# Patient Record
Sex: FEMALE | Race: BLACK OR AFRICAN AMERICAN | NOT HISPANIC OR LATINO | Employment: FULL TIME | RURAL
[De-identification: names, ages, dates, MRNs, and addresses within clinical notes are randomized per-mention and may not be internally consistent; named-entity substitution may affect disease eponyms.]

---

## 2018-05-29 ENCOUNTER — HISTORICAL (OUTPATIENT)
Dept: ADMINISTRATIVE | Facility: HOSPITAL | Age: 28
End: 2018-05-29

## 2018-05-31 LAB
LAB AP CLINICAL INFORMATION: NORMAL
LAB AP COMMENTS: NORMAL
LAB AP DIAGNOSIS - HISTORICAL: NORMAL
LAB AP GROSS PATHOLOGY - HISTORICAL: NORMAL
LAB AP SPECIMEN SUBMITTED - HISTORICAL: NORMAL

## 2018-07-03 ENCOUNTER — HISTORICAL (OUTPATIENT)
Dept: ADMINISTRATIVE | Facility: HOSPITAL | Age: 28
End: 2018-07-03

## 2018-07-06 LAB
LAB AP GENERAL CAT - HISTORICAL: NORMAL
LAB AP INTERPRETATION/RESULT - HISTORICAL: NEGATIVE
LAB AP SPECIMEN ADEQUACY - HISTORICAL: NORMAL
LAB AP SPECIMEN SUBMITTED - HISTORICAL: NORMAL

## 2018-07-16 ENCOUNTER — HISTORICAL (OUTPATIENT)
Dept: ADMINISTRATIVE | Facility: HOSPITAL | Age: 28
End: 2018-07-16

## 2018-07-17 LAB
LAB AP CLINICAL INFORMATION: NORMAL
LAB AP DIAGNOSIS - HISTORICAL: NORMAL
LAB AP GROSS PATHOLOGY - HISTORICAL: NORMAL
LAB AP SPECIMEN SUBMITTED - HISTORICAL: NORMAL

## 2020-04-02 ENCOUNTER — HISTORICAL (OUTPATIENT)
Dept: ADMINISTRATIVE | Facility: HOSPITAL | Age: 30
End: 2020-04-02

## 2021-06-17 ENCOUNTER — OFFICE VISIT (OUTPATIENT)
Dept: OTOLARYNGOLOGY | Facility: CLINIC | Age: 31
End: 2021-06-17
Payer: MEDICAID

## 2021-06-17 VITALS — WEIGHT: 288 LBS | BODY MASS INDEX: 47.98 KG/M2 | HEIGHT: 65 IN

## 2021-06-17 DIAGNOSIS — J02.9 SORE THROAT: Primary | ICD-10-CM

## 2021-06-17 DIAGNOSIS — R13.10 DYSPHAGIA, UNSPECIFIED TYPE: ICD-10-CM

## 2021-06-17 DIAGNOSIS — K21.9 GASTROESOPHAGEAL REFLUX DISEASE, UNSPECIFIED WHETHER ESOPHAGITIS PRESENT: ICD-10-CM

## 2021-06-17 PROCEDURE — 99204 PR OFFICE/OUTPT VISIT, NEW, LEVL IV, 45-59 MIN: ICD-10-PCS | Mod: S$PBB,,, | Performed by: OTOLARYNGOLOGY

## 2021-06-17 PROCEDURE — 99213 OFFICE O/P EST LOW 20 MIN: CPT | Mod: PBBFAC | Performed by: OTOLARYNGOLOGY

## 2021-06-17 PROCEDURE — 99204 OFFICE O/P NEW MOD 45 MIN: CPT | Mod: S$PBB,,, | Performed by: OTOLARYNGOLOGY

## 2021-08-20 ENCOUNTER — OFFICE VISIT (OUTPATIENT)
Dept: FAMILY MEDICINE | Facility: CLINIC | Age: 31
End: 2021-08-20
Payer: MEDICAID

## 2021-08-20 VITALS
HEART RATE: 100 BPM | RESPIRATION RATE: 20 BRPM | OXYGEN SATURATION: 95 % | TEMPERATURE: 98 F | SYSTOLIC BLOOD PRESSURE: 100 MMHG | BODY MASS INDEX: 46.09 KG/M2 | DIASTOLIC BLOOD PRESSURE: 70 MMHG | WEIGHT: 276.63 LBS | HEIGHT: 65 IN

## 2021-08-20 DIAGNOSIS — Z11.52 ENCOUNTER FOR SCREENING FOR COVID-19: ICD-10-CM

## 2021-08-20 DIAGNOSIS — D64.9 ANEMIA, UNSPECIFIED TYPE: Primary | ICD-10-CM

## 2021-08-20 LAB
ANISOCYTOSIS BLD QL SMEAR: NORMAL
BASOPHILS # BLD AUTO: 0.02 K/UL (ref 0–0.2)
BASOPHILS NFR BLD AUTO: 0.5 % (ref 0–1)
CTP QC/QA: YES
DIFFERENTIAL METHOD BLD: ABNORMAL
EOSINOPHIL # BLD AUTO: 0 K/UL (ref 0–0.5)
EOSINOPHIL NFR BLD AUTO: 0 % (ref 1–4)
ERYTHROCYTE [DISTWIDTH] IN BLOOD BY AUTOMATED COUNT: 16.8 % (ref 11.5–14.5)
FLUAV AG NPH QL: NEGATIVE
FLUBV AG NPH QL: NEGATIVE
HCT VFR BLD AUTO: 27 % (ref 38–47)
HGB BLD-MCNC: 7.5 G/DL (ref 12–16)
HYPOCHROMIA BLD QL SMEAR: NORMAL
IMM GRANULOCYTES # BLD AUTO: 0.01 K/UL (ref 0–0.04)
IMM GRANULOCYTES NFR BLD: 0.2 % (ref 0–0.4)
LYMPHOCYTES # BLD AUTO: 1.17 K/UL (ref 1–4.8)
LYMPHOCYTES NFR BLD AUTO: 28.7 % (ref 27–41)
MCH RBC QN AUTO: 20.2 PG (ref 27–31)
MCHC RBC AUTO-ENTMCNC: 27.8 G/DL (ref 32–36)
MCV RBC AUTO: 72.6 FL (ref 80–96)
MICROCYTES BLD QL SMEAR: NORMAL
MONOCYTES # BLD AUTO: 0.67 K/UL (ref 0–0.8)
MONOCYTES NFR BLD AUTO: 16.5 % (ref 2–6)
MPC BLD CALC-MCNC: 10.9 FL (ref 9.4–12.4)
NEUTROPHILS # BLD AUTO: 2.2 K/UL (ref 1.8–7.7)
NEUTROPHILS NFR BLD AUTO: 54.1 % (ref 53–65)
NRBC # BLD AUTO: 0 X10E3/UL
NRBC, AUTO (.00): 0 %
OVALOCYTES BLD QL SMEAR: NORMAL
PLATELET # BLD AUTO: 390 K/UL (ref 150–400)
PLATELET MORPHOLOGY: NORMAL
POLYCHROMASIA BLD QL SMEAR: NORMAL
RBC # BLD AUTO: 3.72 M/UL (ref 4.2–5.4)
ROULEAUX BLD QL SMEAR: NORMAL
SARS-COV-2 AG RESP QL IA.RAPID: NEGATIVE
WBC # BLD AUTO: 4.07 K/UL (ref 4.5–11)

## 2021-08-20 PROCEDURE — 87428 SARSCOV & INF VIR A&B AG IA: CPT | Mod: RHCUB | Performed by: NURSE PRACTITIONER

## 2021-08-20 PROCEDURE — 85025 CBC WITH DIFFERENTIAL: ICD-10-PCS | Mod: ,,, | Performed by: CLINICAL MEDICAL LABORATORY

## 2021-08-20 PROCEDURE — 99213 OFFICE O/P EST LOW 20 MIN: CPT | Mod: ,,, | Performed by: NURSE PRACTITIONER

## 2021-08-20 PROCEDURE — 99213 PR OFFICE/OUTPT VISIT, EST, LEVL III, 20-29 MIN: ICD-10-PCS | Mod: ,,, | Performed by: NURSE PRACTITIONER

## 2021-08-20 PROCEDURE — 85025 COMPLETE CBC W/AUTO DIFF WBC: CPT | Mod: ,,, | Performed by: CLINICAL MEDICAL LABORATORY

## 2022-01-07 ENCOUNTER — CLINICAL SUPPORT (OUTPATIENT)
Dept: PRIMARY CARE CLINIC | Facility: CLINIC | Age: 32
End: 2022-01-07

## 2022-01-07 DIAGNOSIS — Z02.89 ENCOUNTER FOR PHYSICAL EXAMINATION RELATED TO EMPLOYMENT: ICD-10-CM

## 2022-01-07 DIAGNOSIS — Z11.1 SCREENING-PULMONARY TB: Primary | ICD-10-CM

## 2022-01-07 PROCEDURE — 86580 PR  TB INTRADERMAL TEST: ICD-10-PCS | Mod: ,,, | Performed by: NURSE PRACTITIONER

## 2022-01-07 PROCEDURE — 99499 UNLISTED E&M SERVICE: CPT | Mod: ,,, | Performed by: NURSE PRACTITIONER

## 2022-01-07 PROCEDURE — 86580 TB INTRADERMAL TEST: CPT | Mod: ,,, | Performed by: NURSE PRACTITIONER

## 2022-01-07 PROCEDURE — 99499 PR PHYSICAL - BASIC NON DOT/CDL: ICD-10-PCS | Mod: ,,, | Performed by: NURSE PRACTITIONER

## 2022-01-10 NOTE — PROGRESS NOTES
Subjective:       Patient ID: Coby Driver is a 31 y.o. female.    Chief Complaint: No chief complaint on file.    HPI  Review of Systems      Objective:      Physical Exam    Assessment:       Problem List Items Addressed This Visit    None     Visit Diagnoses     Screening-pulmonary TB    -  Primary    Relevant Orders    POCT TB Skin Test (Completed)    Encounter for physical examination related to employment              Plan:       See scanned documents in .

## 2022-01-27 ENCOUNTER — APPOINTMENT (OUTPATIENT)
Dept: LAB | Facility: CLINIC | Age: 32
End: 2022-01-27
Payer: MEDICAID

## 2022-01-27 DIAGNOSIS — Z20.822 EXPOSURE TO COVID-19 VIRUS: Primary | ICD-10-CM

## 2022-01-27 LAB
CTP QC/QA: YES
SARS-COV-2 AG RESP QL IA.RAPID: NEGATIVE

## 2022-01-27 PROCEDURE — 87426 SARSCOV CORONAVIRUS AG IA: CPT | Mod: RHCUB | Performed by: NURSE PRACTITIONER

## 2023-02-23 ENCOUNTER — OFFICE VISIT (OUTPATIENT)
Dept: DERMATOLOGY | Facility: CLINIC | Age: 33
End: 2023-02-23
Payer: COMMERCIAL

## 2023-02-23 DIAGNOSIS — L70.0 ACNE VULGARIS: ICD-10-CM

## 2023-02-23 DIAGNOSIS — L21.9 SEBORRHEIC DERMATITIS: Primary | ICD-10-CM

## 2023-02-23 DIAGNOSIS — L65.9 ALOPECIA: ICD-10-CM

## 2023-02-23 PROCEDURE — 1160F PR REVIEW ALL MEDS BY PRESCRIBER/CLIN PHARMACIST DOCUMENTED: ICD-10-PCS | Mod: CPTII,,, | Performed by: DERMATOLOGY

## 2023-02-23 PROCEDURE — 1159F PR MEDICATION LIST DOCUMENTED IN MEDICAL RECORD: ICD-10-PCS | Mod: CPTII,,, | Performed by: DERMATOLOGY

## 2023-02-23 PROCEDURE — 1160F RVW MEDS BY RX/DR IN RCRD: CPT | Mod: CPTII,,, | Performed by: DERMATOLOGY

## 2023-02-23 PROCEDURE — 99204 OFFICE O/P NEW MOD 45 MIN: CPT | Mod: ,,, | Performed by: DERMATOLOGY

## 2023-02-23 PROCEDURE — 3044F PR MOST RECENT HEMOGLOBIN A1C LEVEL <7.0%: ICD-10-PCS | Mod: CPTII,,, | Performed by: DERMATOLOGY

## 2023-02-23 PROCEDURE — 3044F HG A1C LEVEL LT 7.0%: CPT | Mod: CPTII,,, | Performed by: DERMATOLOGY

## 2023-02-23 PROCEDURE — 1159F MED LIST DOCD IN RCRD: CPT | Mod: CPTII,,, | Performed by: DERMATOLOGY

## 2023-02-23 PROCEDURE — 99204 PR OFFICE/OUTPT VISIT, NEW, LEVL IV, 45-59 MIN: ICD-10-PCS | Mod: ,,, | Performed by: DERMATOLOGY

## 2023-02-23 RX ORDER — TRETINOIN 0.25 MG/G
CREAM TOPICAL
Qty: 20 G | Refills: 5 | OUTPATIENT
Start: 2023-02-23 | End: 2023-05-27

## 2023-02-23 RX ORDER — KETOCONAZOLE 20 MG/ML
SHAMPOO, SUSPENSION TOPICAL
Qty: 120 ML | Refills: 11 | OUTPATIENT
Start: 2023-02-23 | End: 2023-05-27

## 2023-02-23 RX ORDER — DESONIDE 0.5 MG/G
CREAM TOPICAL
Qty: 60 G | Refills: 3 | OUTPATIENT
Start: 2023-02-23 | End: 2023-05-27

## 2023-02-23 RX ORDER — ASPIRIN 325 MG
50000 TABLET, DELAYED RELEASE (ENTERIC COATED) ORAL
COMMUNITY
Start: 2023-01-26 | End: 2023-05-27

## 2023-02-23 RX ORDER — CICLOPIROX 80 MG/ML
SOLUTION TOPICAL
Qty: 6.6 ML | Refills: 5 | OUTPATIENT
Start: 2023-02-23 | End: 2023-05-27

## 2023-02-23 RX ORDER — KETOCONAZOLE 20 MG/G
CREAM TOPICAL
Qty: 30 G | Refills: 6 | Status: CANCELLED | OUTPATIENT
Start: 2023-02-23

## 2023-02-23 RX ORDER — CLOBETASOL PROPIONATE 0.46 MG/ML
SOLUTION TOPICAL
Qty: 50 ML | Refills: 3 | OUTPATIENT
Start: 2023-02-23 | End: 2023-05-27

## 2023-05-19 ENCOUNTER — OFFICE VISIT (OUTPATIENT)
Dept: OBSTETRICS AND GYNECOLOGY | Facility: CLINIC | Age: 33
End: 2023-05-19
Payer: COMMERCIAL

## 2023-05-19 VITALS
RESPIRATION RATE: 17 BRPM | OXYGEN SATURATION: 99 % | WEIGHT: 292.19 LBS | SYSTOLIC BLOOD PRESSURE: 138 MMHG | BODY MASS INDEX: 48.68 KG/M2 | TEMPERATURE: 99 F | HEART RATE: 89 BPM | DIASTOLIC BLOOD PRESSURE: 87 MMHG | HEIGHT: 65 IN

## 2023-05-19 DIAGNOSIS — N92.1 BREAKTHROUGH BLEEDING ON NEXPLANON: Primary | ICD-10-CM

## 2023-05-19 DIAGNOSIS — Z97.5 BREAKTHROUGH BLEEDING ON NEXPLANON: Primary | ICD-10-CM

## 2023-05-19 DIAGNOSIS — N92.6 IRREGULAR MENSTRUAL CYCLE: ICD-10-CM

## 2023-05-19 DIAGNOSIS — Z32.02 URINE PREGNANCY TEST NEGATIVE: ICD-10-CM

## 2023-05-19 LAB
B-HCG UR QL: NEGATIVE
CTP QC/QA: YES

## 2023-05-19 PROCEDURE — 99203 OFFICE O/P NEW LOW 30 MIN: CPT | Mod: ,,, | Performed by: ADVANCED PRACTICE MIDWIFE

## 2023-05-19 PROCEDURE — 3075F SYST BP GE 130 - 139MM HG: CPT | Mod: CPTII,,, | Performed by: ADVANCED PRACTICE MIDWIFE

## 2023-05-19 PROCEDURE — 3044F HG A1C LEVEL LT 7.0%: CPT | Mod: CPTII,,, | Performed by: ADVANCED PRACTICE MIDWIFE

## 2023-05-19 PROCEDURE — 99203 PR OFFICE/OUTPT VISIT, NEW, LEVL III, 30-44 MIN: ICD-10-PCS | Mod: ,,, | Performed by: ADVANCED PRACTICE MIDWIFE

## 2023-05-19 PROCEDURE — 3008F BODY MASS INDEX DOCD: CPT | Mod: CPTII,,, | Performed by: ADVANCED PRACTICE MIDWIFE

## 2023-05-19 PROCEDURE — 1159F MED LIST DOCD IN RCRD: CPT | Mod: CPTII,,, | Performed by: ADVANCED PRACTICE MIDWIFE

## 2023-05-19 PROCEDURE — 3075F PR MOST RECENT SYSTOLIC BLOOD PRESS GE 130-139MM HG: ICD-10-PCS | Mod: CPTII,,, | Performed by: ADVANCED PRACTICE MIDWIFE

## 2023-05-19 PROCEDURE — 3079F DIAST BP 80-89 MM HG: CPT | Mod: CPTII,,, | Performed by: ADVANCED PRACTICE MIDWIFE

## 2023-05-19 PROCEDURE — 3044F PR MOST RECENT HEMOGLOBIN A1C LEVEL <7.0%: ICD-10-PCS | Mod: CPTII,,, | Performed by: ADVANCED PRACTICE MIDWIFE

## 2023-05-19 PROCEDURE — 3079F PR MOST RECENT DIASTOLIC BLOOD PRESSURE 80-89 MM HG: ICD-10-PCS | Mod: CPTII,,, | Performed by: ADVANCED PRACTICE MIDWIFE

## 2023-05-19 PROCEDURE — 1159F PR MEDICATION LIST DOCUMENTED IN MEDICAL RECORD: ICD-10-PCS | Mod: CPTII,,, | Performed by: ADVANCED PRACTICE MIDWIFE

## 2023-05-19 PROCEDURE — 81025 URINE PREGNANCY TEST: CPT | Mod: QW,,, | Performed by: ADVANCED PRACTICE MIDWIFE

## 2023-05-19 PROCEDURE — 3008F PR BODY MASS INDEX (BMI) DOCUMENTED: ICD-10-PCS | Mod: CPTII,,, | Performed by: ADVANCED PRACTICE MIDWIFE

## 2023-05-19 PROCEDURE — 81025 POCT URINE PREGNANCY: ICD-10-PCS | Mod: QW,,, | Performed by: ADVANCED PRACTICE MIDWIFE

## 2023-05-19 RX ORDER — MEDROXYPROGESTERONE ACETATE 10 MG/1
10 TABLET ORAL DAILY
Qty: 10 TABLET | Refills: 0 | Status: SHIPPED | OUTPATIENT
Start: 2023-05-19 | End: 2023-06-01

## 2023-05-19 RX ORDER — FERROUS SULFATE 325(65) MG
325 TABLET, DELAYED RELEASE (ENTERIC COATED) ORAL 2 TIMES DAILY
Qty: 60 TABLET | Refills: 2 | Status: SHIPPED | OUTPATIENT
Start: 2023-05-19 | End: 2023-08-17

## 2023-05-19 NOTE — PROGRESS NOTES
"Patient ID:  Coby Driver is a 32 y.o. female      Chief Complaint:   Chief Complaint   Patient presents with    Irregular cycles     Bleeding x 3 weeks        HPI:  Coby is in for annual exam with Pap but has started cycle bleeding. Reports having Nexplanon placed 3 years past after delivery of her last child. Has had recent onset of irregular vag bleeding and weight gain. Desires management of bleeding. Discussed replacement of nexplanon or removal with no replacement as weight gain is a possible s/e. Has had weight loss surgery in past. Reports issues with low iron reviewed labs ordered by PCP. Has had Covid infection in past.  Unsure of last Pap date but reports results normal.   LMP: No LMP recorded. (Menstrual status: Birth Control).   Sexually active:  yes, , declines STD testing  Contraceptive: Nexplanon      Past Medical History:   Diagnosis Date    Acne vulgaris     Alopecia     Anemia      Past Surgical History:   Procedure Laterality Date    GASTRIC RESTRICTION SURGERY      REDUCTION OF BOTH BREASTS Bilateral     TONSILLECTOMY Bilateral        OB History          3    Para   3    Term                AB        Living             SAB        IAB        Ectopic        Multiple        Live Births   3                 /87 (BP Location: Right arm)   Pulse 89   Temp 99 °F (37.2 °C)   Resp 17   Ht 5' 5" (1.651 m)   Wt 132.5 kg (292 lb 3.2 oz)   SpO2 99%   BMI 48.62 kg/m²   Wt Readings from Last 3 Encounters:   23 132.5 kg (292 lb 3.2 oz)   21 125.5 kg (276 lb 9.6 oz)   21 130.6 kg (288 lb)          ROS:  Review of Systems   Constitutional:  Positive for unexpected weight change.   Eyes: Negative.    Respiratory: Negative.     Cardiovascular: Negative.    Gastrointestinal: Negative.    Genitourinary: Negative.    Musculoskeletal: Negative.    Neurological: Negative.    Psychiatric/Behavioral: Negative.          PHYSICAL EXAM:  Physical Exam  Constitutional:  "      Appearance: Normal appearance. She is obese.   Eyes:      Extraocular Movements: Extraocular movements intact.   Cardiovascular:      Rate and Rhythm: Normal rate.      Pulses: Normal pulses.   Pulmonary:      Effort: Pulmonary effort is normal.   Musculoskeletal:         General: Normal range of motion.      Cervical back: Normal range of motion.   Skin:     General: Skin is warm and dry.   Neurological:      Mental Status: She is alert and oriented to person, place, and time.   Psychiatric:         Mood and Affect: Mood normal.         Behavior: Behavior normal.         Thought Content: Thought content normal.         Judgment: Judgment normal.        Assessment:  Coby was seen today for irregular cycles.    Diagnoses and all orders for this visit:    Breakthrough bleeding on Nexplanon  -     medroxyPROGESTERone (PROVERA) 10 MG tablet; Take 1 tablet (10 mg total) by mouth once daily. for 10 days    Irregular menstrual cycle  -     POCT urine pregnancy  -     medroxyPROGESTERone (PROVERA) 10 MG tablet; Take 1 tablet (10 mg total) by mouth once daily. for 10 days    Urine pregnancy test negative    BMI 45.0-49.9, adult    Other orders  -     ferrous sulfate 325 (65 FE) MG EC tablet; Take 1 tablet (325 mg total) by mouth 2 (two) times daily.          ICD-10-CM ICD-9-CM    1. Breakthrough bleeding on Nexplanon  N92.1 626.6 medroxyPROGESTERone (PROVERA) 10 MG tablet    Z97.5 V45.59       2. Irregular menstrual cycle  N92.6 626.4 POCT urine pregnancy      medroxyPROGESTERone (PROVERA) 10 MG tablet      3. Urine pregnancy test negative  Z32.02 V72.41       4. BMI 45.0-49.9, adult  Z68.42 V85.42           Plan:  UPT negative  Discussed management of BTB with provera and OCP pack  Rx sent for Provera dosing, instructed on use  Sample pack of OCP given, instructed to start after provera completed if bleeding continues  Discussed nexplanon replacement or removal with no replacement  Desires OCP if decides to have  nexplanon removed w/o replacement  Rx sent for iron supplement, instructed on use    Follow up for care as needed, annual exam with Pap when bleeding stops, schedule Nexplanon procedure when ready.

## 2023-05-21 PROBLEM — N92.6 IRREGULAR MENSTRUAL CYCLE: Status: ACTIVE | Noted: 2023-05-21

## 2023-05-21 PROBLEM — Z97.5 BREAKTHROUGH BLEEDING ON NEXPLANON: Status: ACTIVE | Noted: 2023-05-21

## 2023-05-21 PROBLEM — N92.1 BREAKTHROUGH BLEEDING ON NEXPLANON: Status: ACTIVE | Noted: 2023-05-21

## 2023-05-27 ENCOUNTER — HOSPITAL ENCOUNTER (EMERGENCY)
Facility: HOSPITAL | Age: 33
Discharge: HOME | End: 2023-05-27
Payer: COMMERCIAL

## 2023-05-27 VITALS
TEMPERATURE: 98 F | RESPIRATION RATE: 19 BRPM | OXYGEN SATURATION: 97 % | HEART RATE: 98 BPM | BODY MASS INDEX: 46.65 KG/M2 | SYSTOLIC BLOOD PRESSURE: 140 MMHG | WEIGHT: 280 LBS | DIASTOLIC BLOOD PRESSURE: 84 MMHG | HEIGHT: 65 IN

## 2023-05-27 DIAGNOSIS — S90.31XA CONTUSION OF RIGHT FOOT, INITIAL ENCOUNTER: Primary | ICD-10-CM

## 2023-05-27 DIAGNOSIS — T14.90XA INJURY: ICD-10-CM

## 2023-05-27 PROCEDURE — 63600175 PHARM REV CODE 636 W HCPCS: Performed by: NURSE PRACTITIONER

## 2023-05-27 PROCEDURE — 96372 THER/PROPH/DIAG INJ SC/IM: CPT | Performed by: NURSE PRACTITIONER

## 2023-05-27 PROCEDURE — 99284 EMERGENCY DEPT VISIT MOD MDM: CPT

## 2023-05-27 PROCEDURE — 99284 PR EMERGENCY DEPT VISIT,LEVEL IV: ICD-10-PCS | Mod: ,,, | Performed by: NURSE PRACTITIONER

## 2023-05-27 PROCEDURE — 99284 EMERGENCY DEPT VISIT MOD MDM: CPT | Mod: ,,, | Performed by: NURSE PRACTITIONER

## 2023-05-27 RX ORDER — ONDANSETRON 2 MG/ML
4 INJECTION INTRAMUSCULAR; INTRAVENOUS
Status: COMPLETED | OUTPATIENT
Start: 2023-05-27 | End: 2023-05-27

## 2023-05-27 RX ORDER — MORPHINE SULFATE 4 MG/ML
4 INJECTION, SOLUTION INTRAMUSCULAR; INTRAVENOUS
Status: COMPLETED | OUTPATIENT
Start: 2023-05-27 | End: 2023-05-27

## 2023-05-27 RX ADMIN — ONDANSETRON 4 MG: 2 INJECTION INTRAMUSCULAR; INTRAVENOUS at 05:05

## 2023-05-27 RX ADMIN — MORPHINE SULFATE 4 MG: 4 INJECTION INTRAVENOUS at 05:05

## 2023-05-27 NOTE — Clinical Note
"Coby Holley" Villa was seen and treated in our emergency department on 5/27/2023.  She may return to work on 05/31/2023.       If you have any questions or concerns, please don't hesitate to call.      BLAIRE Robertson"

## 2023-05-27 NOTE — DISCHARGE INSTRUCTIONS
Follow up with primary care provider if no improvement over then next 7 days or sooner if symptoms change or worsen.  Ice to area 20 minutes every 2 hours when possible.  Keep elevated when possible.  Use crutches when ambulating if necessary.  Motrin 600mg every 8 hours as needed for pain.  Tylenol 650 mg every 4 hours as needed for pain.  Return to emergency department for any new or worsening symtpoms.

## 2023-06-01 ENCOUNTER — OFFICE VISIT (OUTPATIENT)
Dept: OBSTETRICS AND GYNECOLOGY | Facility: CLINIC | Age: 33
End: 2023-06-01
Payer: COMMERCIAL

## 2023-06-01 VITALS
OXYGEN SATURATION: 100 % | TEMPERATURE: 98 F | DIASTOLIC BLOOD PRESSURE: 79 MMHG | WEIGHT: 286.63 LBS | SYSTOLIC BLOOD PRESSURE: 132 MMHG | HEART RATE: 72 BPM | HEIGHT: 65 IN | BODY MASS INDEX: 47.75 KG/M2

## 2023-06-01 DIAGNOSIS — Z30.011 ENCOUNTER FOR INITIAL PRESCRIPTION OF CONTRACEPTIVE PILLS: ICD-10-CM

## 2023-06-01 DIAGNOSIS — Z30.46 ENCOUNTER FOR NEXPLANON REMOVAL: Primary | ICD-10-CM

## 2023-06-01 PROCEDURE — 99499 NO LOS: ICD-10-PCS | Mod: ,,, | Performed by: ADVANCED PRACTICE MIDWIFE

## 2023-06-01 PROCEDURE — 11982 REMOVE DRUG IMPLANT DEVICE: CPT | Mod: ,,, | Performed by: ADVANCED PRACTICE MIDWIFE

## 2023-06-01 PROCEDURE — 99499 UNLISTED E&M SERVICE: CPT | Mod: ,,, | Performed by: ADVANCED PRACTICE MIDWIFE

## 2023-06-01 PROCEDURE — 11982 REMOVAL OF NEXPLANON DEVICE: ICD-10-PCS | Mod: ,,, | Performed by: ADVANCED PRACTICE MIDWIFE

## 2023-06-01 RX ORDER — LEVONORGESTREL AND ETHINYL ESTRADIOL 0.1-0.02MG
1 KIT ORAL DAILY
Qty: 30 TABLET | Refills: 11 | Status: SHIPPED | OUTPATIENT
Start: 2023-06-01 | End: 2023-06-13

## 2023-06-02 NOTE — PROCEDURES
Removal of Nexplanon Device    Date/Time: 6/1/2023 10:00 AM  Performed by: Stephanie Javier CNM  Authorized by: Stephanie Javier CNM     Consent obtained:  Verbal and written  Consent given by:  Patient  Procedure risks and benefits discussed: yes    Patient questions answered: yes    Patient agrees, verbalizes understanding, and wants to proceed: yes    Educational handouts given: yes    Instructions and paperwork completed: yes    Implant grasped by: hemostat  Removal due to infection and inflammatory reaction: no    Other reason for removal:  Weight gain  Removal due to mechanical complications of IUD/Nexplanon: no    Removed with no complications: yes     Post procedure care instructions given. Rx sent for contraceptive pills as requested. Instructed on use.   Removal due to expiration: yes    Arm: left  Palpation confirms location: yes  Small stab incision was made in arm: yes  Upon removal device was intact: yes  Site was close with steri-strips and pressure bandage applied: yes  Pre-procedure timeout performed: yes  Prepped with:  povidone-iodine 7.5% surgical scrub and alcohol 70%  Local anesthetic:  Lidocaine with epinephrine   The site was cleaned  and prepped in a sterile fashion: yes

## 2023-06-05 ENCOUNTER — OFFICE VISIT (OUTPATIENT)
Dept: OBSTETRICS AND GYNECOLOGY | Facility: CLINIC | Age: 33
End: 2023-06-05
Payer: COMMERCIAL

## 2023-06-05 VITALS
OXYGEN SATURATION: 100 % | DIASTOLIC BLOOD PRESSURE: 83 MMHG | BODY MASS INDEX: 48.15 KG/M2 | WEIGHT: 289 LBS | HEIGHT: 65 IN | HEART RATE: 98 BPM | SYSTOLIC BLOOD PRESSURE: 127 MMHG

## 2023-06-05 DIAGNOSIS — Z01.419 WOMEN'S ANNUAL ROUTINE GYNECOLOGICAL EXAMINATION: Primary | ICD-10-CM

## 2023-06-05 DIAGNOSIS — Z12.4 ENCOUNTER FOR PAPANICOLAOU SMEAR FOR CERVICAL CANCER SCREENING: ICD-10-CM

## 2023-06-05 DIAGNOSIS — Z11.51 SCREENING FOR HPV (HUMAN PAPILLOMAVIRUS): ICD-10-CM

## 2023-06-05 DIAGNOSIS — Z20.2 ENCOUNTER FOR ASSESSMENT OF STD EXPOSURE: ICD-10-CM

## 2023-06-05 LAB
CANDIDA SPECIES: NEGATIVE
GARDNERELLA: POSITIVE
HIV 1+O+2 AB SERPL QL: NORMAL
SYPHILIS AB INTERPRETATION: NORMAL
TRICHOMONAS: NEGATIVE

## 2023-06-05 PROCEDURE — 3044F PR MOST RECENT HEMOGLOBIN A1C LEVEL <7.0%: ICD-10-PCS | Mod: CPTII,,, | Performed by: ADVANCED PRACTICE MIDWIFE

## 2023-06-05 PROCEDURE — 87389 HIV 1 / 2 ANTIBODY: ICD-10-PCS | Mod: ,,, | Performed by: CLINICAL MEDICAL LABORATORY

## 2023-06-05 PROCEDURE — 86780 TREPONEMA PALLIDUM (SYPHILIS) ANTIBODY: ICD-10-PCS | Mod: ,,, | Performed by: CLINICAL MEDICAL LABORATORY

## 2023-06-05 PROCEDURE — 87591 CHLAMYDIA/GONORRHOEAE(GC), PCR: ICD-10-PCS | Mod: ,,, | Performed by: CLINICAL MEDICAL LABORATORY

## 2023-06-05 PROCEDURE — 87491 CHLAMYDIA/GONORRHOEAE(GC), PCR: ICD-10-PCS | Mod: ,,, | Performed by: CLINICAL MEDICAL LABORATORY

## 2023-06-05 PROCEDURE — 87510 BACTERIAL VAGINOSIS: ICD-10-PCS | Mod: ,,, | Performed by: CLINICAL MEDICAL LABORATORY

## 2023-06-05 PROCEDURE — 3079F DIAST BP 80-89 MM HG: CPT | Mod: CPTII,,, | Performed by: ADVANCED PRACTICE MIDWIFE

## 2023-06-05 PROCEDURE — 99395 PR PREVENTIVE VISIT,EST,18-39: ICD-10-PCS | Mod: ,,, | Performed by: ADVANCED PRACTICE MIDWIFE

## 2023-06-05 PROCEDURE — 87480 BACTERIAL VAGINOSIS: ICD-10-PCS | Mod: ,,, | Performed by: CLINICAL MEDICAL LABORATORY

## 2023-06-05 PROCEDURE — 3008F PR BODY MASS INDEX (BMI) DOCUMENTED: ICD-10-PCS | Mod: CPTII,,, | Performed by: ADVANCED PRACTICE MIDWIFE

## 2023-06-05 PROCEDURE — 87510 GARDNER VAG DNA DIR PROBE: CPT | Mod: ,,, | Performed by: CLINICAL MEDICAL LABORATORY

## 2023-06-05 PROCEDURE — 3074F PR MOST RECENT SYSTOLIC BLOOD PRESSURE < 130 MM HG: ICD-10-PCS | Mod: CPTII,,, | Performed by: ADVANCED PRACTICE MIDWIFE

## 2023-06-05 PROCEDURE — 87491 CHLMYD TRACH DNA AMP PROBE: CPT | Mod: ,,, | Performed by: CLINICAL MEDICAL LABORATORY

## 2023-06-05 PROCEDURE — 1159F PR MEDICATION LIST DOCUMENTED IN MEDICAL RECORD: ICD-10-PCS | Mod: CPTII,,, | Performed by: ADVANCED PRACTICE MIDWIFE

## 2023-06-05 PROCEDURE — 86780 TREPONEMA PALLIDUM: CPT | Mod: ,,, | Performed by: CLINICAL MEDICAL LABORATORY

## 2023-06-05 PROCEDURE — 88142 CYTOPATH C/V THIN LAYER: CPT | Mod: TC,GCY | Performed by: ADVANCED PRACTICE MIDWIFE

## 2023-06-05 PROCEDURE — 3074F SYST BP LT 130 MM HG: CPT | Mod: CPTII,,, | Performed by: ADVANCED PRACTICE MIDWIFE

## 2023-06-05 PROCEDURE — 87591 N.GONORRHOEAE DNA AMP PROB: CPT | Mod: ,,, | Performed by: CLINICAL MEDICAL LABORATORY

## 2023-06-05 PROCEDURE — 87389 HIV-1 AG W/HIV-1&-2 AB AG IA: CPT | Mod: ,,, | Performed by: CLINICAL MEDICAL LABORATORY

## 2023-06-05 PROCEDURE — 3044F HG A1C LEVEL LT 7.0%: CPT | Mod: CPTII,,, | Performed by: ADVANCED PRACTICE MIDWIFE

## 2023-06-05 PROCEDURE — 87480 CANDIDA DNA DIR PROBE: CPT | Mod: ,,, | Performed by: CLINICAL MEDICAL LABORATORY

## 2023-06-05 PROCEDURE — 87660 TRICHOMONAS VAGIN DIR PROBE: CPT | Mod: ,,, | Performed by: CLINICAL MEDICAL LABORATORY

## 2023-06-05 PROCEDURE — 87660 BACTERIAL VAGINOSIS: ICD-10-PCS | Mod: ,,, | Performed by: CLINICAL MEDICAL LABORATORY

## 2023-06-05 PROCEDURE — 3079F PR MOST RECENT DIASTOLIC BLOOD PRESSURE 80-89 MM HG: ICD-10-PCS | Mod: CPTII,,, | Performed by: ADVANCED PRACTICE MIDWIFE

## 2023-06-05 PROCEDURE — 87624 HUMAN PAPILLOMAVIRUS (HPV): ICD-10-PCS | Mod: ,,, | Performed by: CLINICAL MEDICAL LABORATORY

## 2023-06-05 PROCEDURE — 3008F BODY MASS INDEX DOCD: CPT | Mod: CPTII,,, | Performed by: ADVANCED PRACTICE MIDWIFE

## 2023-06-05 PROCEDURE — 87624 HPV HI-RISK TYP POOLED RSLT: CPT | Mod: ,,, | Performed by: CLINICAL MEDICAL LABORATORY

## 2023-06-05 PROCEDURE — 99395 PREV VISIT EST AGE 18-39: CPT | Mod: ,,, | Performed by: ADVANCED PRACTICE MIDWIFE

## 2023-06-05 PROCEDURE — 1159F MED LIST DOCD IN RCRD: CPT | Mod: CPTII,,, | Performed by: ADVANCED PRACTICE MIDWIFE

## 2023-06-06 LAB
CHLAMYDIA BY PCR: POSITIVE
N. GONORRHOEAE (GC) BY PCR: NEGATIVE

## 2023-06-07 DIAGNOSIS — A74.9 CHLAMYDIA INFECTION: ICD-10-CM

## 2023-06-07 DIAGNOSIS — B96.89 BACTERIAL VAGINAL INFECTION: Primary | ICD-10-CM

## 2023-06-07 DIAGNOSIS — N76.0 BACTERIAL VAGINAL INFECTION: Primary | ICD-10-CM

## 2023-06-07 LAB
GH SERPL-MCNC: NORMAL NG/ML
INSULIN SERPL-ACNC: NORMAL U[IU]/ML
LAB AP CLINICAL INFORMATION: NORMAL
LAB AP GYN INTERPRETATION: NEGATIVE
LAB AP PAP DISCLAIMER COMMENTS: NORMAL
RENIN PLAS-CCNC: NORMAL NG/ML/H

## 2023-06-07 RX ORDER — FLUCONAZOLE 150 MG/1
150 TABLET ORAL
Qty: 2 TABLET | Refills: 0 | Status: SHIPPED | OUTPATIENT
Start: 2023-06-07 | End: 2023-06-15

## 2023-06-07 RX ORDER — AZITHROMYCIN 500 MG/1
1000 TABLET, FILM COATED ORAL DAILY
Qty: 2 TABLET | Refills: 0 | Status: SHIPPED | OUTPATIENT
Start: 2023-06-07 | End: 2023-06-08

## 2023-06-07 RX ORDER — METRONIDAZOLE 500 MG/1
500 TABLET ORAL 2 TIMES DAILY
Qty: 14 TABLET | Refills: 0 | Status: SHIPPED | OUTPATIENT
Start: 2023-06-07 | End: 2023-06-14

## 2023-06-08 ENCOUNTER — TELEPHONE (OUTPATIENT)
Dept: OBSTETRICS AND GYNECOLOGY | Facility: CLINIC | Age: 33
End: 2023-06-08
Payer: COMMERCIAL

## 2023-06-08 NOTE — TELEPHONE ENCOUNTER
----- Message from Natali Tucker sent at 6/8/2023 10:07 AM CDT -----  Pt called requesting a call back.    Call back # [phone]

## 2023-06-08 NOTE — PROGRESS NOTES
"  Patient ID: Coby Driver is a 32 y.o. female     Chief Complaint:   Chief Complaint   Patient presents with    Annual Exam       HPI: Coby Driver is a 32 y.o. female who presents for well woman exam with Pap.  No issues, problems, or complaints.  Last Pap NILM HPV neg 2019.   LMP: Patient's last menstrual period was 2023.  Sexually active: yes, , desires STD testing including HIV/syphilis   Contraceptive: ocp  Mammogram: n/a    Past Medical History:   Diagnosis Date    Acne vulgaris     Alopecia     Anemia        Past Surgical History:   Procedure Laterality Date    GASTRIC RESTRICTION SURGERY      REDUCTION OF BOTH BREASTS Bilateral     TONSILLECTOMY Bilateral        Social History     Socioeconomic History    Marital status: Single   Tobacco Use    Smoking status: Never    Smokeless tobacco: Never   Substance and Sexual Activity    Alcohol use: Never    Drug use: Never    Sexual activity: Yes       History reviewed. No pertinent family history.    OB History          3    Para   3    Term                AB        Living             SAB        IAB        Ectopic        Multiple        Live Births   3                 /83 (BP Location: Right arm, Patient Position: Sitting, BP Method: Large (Automatic))   Pulse 98   Ht 5' 5" (1.651 m)   Wt 131.1 kg (289 lb)   LMP 2023 Comment: cycle last from february to may  SpO2 100%   BMI 48.09 kg/m²     Wt Readings from Last 3 Encounters:   23 131.1 kg (289 lb)   23 130 kg (286 lb 9.6 oz)   23 127 kg (280 lb)        ROS:  Review of Systems   Constitutional: Negative.    Eyes: Negative.    Respiratory: Negative.     Cardiovascular: Negative.    Gastrointestinal: Negative.    Endocrine: Negative.    Genitourinary: Negative.    Musculoskeletal: Negative.    Integumentary:  Negative.   Neurological: Negative.    Hematological: Negative.    Psychiatric/Behavioral: Negative.     Breast: negative.   "     PHYSICAL EXAM:  Physical Exam  Constitutional:       Appearance: Normal appearance. She is obese.   HENT:      Head: Normocephalic.      Nose: Nose normal.   Eyes:      Extraocular Movements: Extraocular movements intact.   Cardiovascular:      Rate and Rhythm: Normal rate and regular rhythm.      Pulses: Normal pulses.      Heart sounds: Normal heart sounds.   Pulmonary:      Effort: Pulmonary effort is normal.      Breath sounds: Normal breath sounds.   Chest:   Breasts:     Right: Normal.      Left: Normal.      Comments: Healed scars noted from bilateral breast reduction  Abdominal:      Palpations: Abdomen is soft.      Hernia: There is no hernia in the left inguinal area or right inguinal area.   Genitourinary:     General: Normal vulva.      Exam position: Lithotomy position.      Pubic Area: No rash.       Labia:         Right: No rash.         Left: No rash.       Urethra: No prolapse.      Vagina: Normal.      Cervix: Normal.      Uterus: Normal.       Adnexa:         Right: No mass or tenderness.          Left: No mass or tenderness.     Musculoskeletal:         General: Normal range of motion.      Cervical back: Normal range of motion and neck supple.   Skin:     General: Skin is warm and dry.   Neurological:      Mental Status: She is alert and oriented to person, place, and time.   Psychiatric:         Mood and Affect: Mood normal.         Behavior: Behavior normal.         Thought Content: Thought content normal.         Judgment: Judgment normal.        Assessment:  Women's annual routine gynecological examination  -     ThinPrep Pap Test; Future; Expected date: 06/05/2023  -     Treponema Pallidum (Syphillis) Antibody; Future; Expected date: 06/05/2023  -     HIV 1/2 Ag/Ab (4th Gen); Future; Expected date: 06/05/2023  -     Chlamydia/GC, PCR; Future; Expected date: 06/05/2023  -     Bacterial Vaginosis; Future; Expected date: 06/05/2023  -     HPV DNA probe, amplified    Encounter for  Papanicolaou smear for cervical cancer screening  -     ThinPrep Pap Test; Future; Expected date: 06/05/2023  -     HPV DNA probe, amplified    Encounter for assessment of STD exposure  -     Treponema Pallidum (Syphillis) Antibody; Future; Expected date: 06/05/2023  -     HIV 1/2 Ag/Ab (4th Gen); Future; Expected date: 06/05/2023  -     Chlamydia/GC, PCR; Future; Expected date: 06/05/2023  -     Bacterial Vaginosis; Future; Expected date: 06/05/2023    Screening for HPV (human papillomavirus)  -     ThinPrep Pap Test; Future; Expected date: 06/05/2023  -     HPV DNA probe, amplified    BMI 45.0-49.9, adult          ICD-10-CM ICD-9-CM    1. Women's annual routine gynecological examination  Z01.419 V72.31 ThinPrep Pap Test      Treponema Pallidum (Syphillis) Antibody      HIV 1/2 Ag/Ab (4th Gen)      Chlamydia/GC, PCR      Bacterial Vaginosis      ThinPrep Pap Test      Treponema Pallidum (Syphillis) Antibody      HIV 1/2 Ag/Ab (4th Gen)      Chlamydia/GC, PCR      Bacterial Vaginosis      HPV DNA probe, amplified      2. Encounter for Papanicolaou smear for cervical cancer screening  Z12.4 V76.2 ThinPrep Pap Test      ThinPrep Pap Test      HPV DNA probe, amplified      3. Encounter for assessment of STD exposure  Z76.89 V72.85 Treponema Pallidum (Syphillis) Antibody      HIV 1/2 Ag/Ab (4th Gen)      Chlamydia/GC, PCR      Bacterial Vaginosis      Treponema Pallidum (Syphillis) Antibody      HIV 1/2 Ag/Ab (4th Gen)      Chlamydia/GC, PCR      Bacterial Vaginosis      4. Screening for HPV (human papillomavirus)  Z11.51 V73.81 ThinPrep Pap Test      ThinPrep Pap Test      HPV DNA probe, amplified      5. BMI 45.0-49.9, adult  Z68.42 V85.42           Plan:  Annual exam completed  Patient was counseled today on ASCCP Pap guidelines and recommendations for yearly pelvic exams   Encouraged healthy dietary choices, increasing water intake, and exercising at least 3 x weekly      Follow up in about 1 year (around 6/5/2024)  for yearly gyn exam.

## 2023-06-10 LAB
HPV 16: NEGATIVE
HPV 18: NEGATIVE
HPV OTHER: NEGATIVE

## 2023-06-13 DIAGNOSIS — Z30.011 ENCOUNTER FOR INITIAL PRESCRIPTION OF CONTRACEPTIVE PILLS: ICD-10-CM

## 2023-06-13 DIAGNOSIS — A74.9 CHLAMYDIA INFECTION: Primary | ICD-10-CM

## 2023-06-13 RX ORDER — LEVONORGESTREL AND ETHINYL ESTRADIOL 0.1-0.02MG
1 KIT ORAL DAILY
Qty: 30 TABLET | Refills: 11 | Status: SHIPPED | OUTPATIENT
Start: 2023-06-13 | End: 2023-10-31

## 2023-06-13 RX ORDER — MEDROXYPROGESTERONE ACETATE 150 MG/ML
150 INJECTION, SUSPENSION INTRAMUSCULAR ONCE
Status: DISCONTINUED | OUTPATIENT
Start: 2023-06-13 | End: 2023-06-13

## 2023-06-13 RX ORDER — DOXYCYCLINE 100 MG/1
100 CAPSULE ORAL 2 TIMES DAILY
Qty: 14 CAPSULE | Refills: 0 | Status: SHIPPED | OUTPATIENT
Start: 2023-06-13 | End: 2023-06-20

## 2023-06-28 ENCOUNTER — NUTRITION (OUTPATIENT)
Dept: FAMILY MEDICINE | Facility: CLINIC | Age: 33
End: 2023-06-28
Payer: COMMERCIAL

## 2023-06-29 NOTE — PROGRESS NOTES
Pt comes in to talk about Wt Management, post gastric sleeve procedure in 2017. Has gained and lost weight, today wants to get a PEP talk and review on Behavior Modification for healthy eating and wt loss goals. Looking into [facility] past chart, weight on 10/2016 377lbs, weight on 11/2018 237lbs. Today's weight 298lbs. We reviewed portion control with use of < 1/4cup items, drinking water in small amount thru the day, spacing water or SF beverage between meals and not with meals. I suggested  no fried items, much less fat in meal prep with flavor added with garlic,spices, salsa, veggies. Pt to begin going to gym, spouse will be able to stay with children in the morning times so Pt plans to start daily exercise regime.   Motivation tips to strive for behavior modification of daily moving, eating variety and measuring servings using a small child plate, can use with children to practice variety in their meals. I asked Pt to call any Monday-Thursday, gave number and we could plan a routine time to discuss wt loss goal.

## 2023-06-30 ENCOUNTER — OFFICE VISIT (OUTPATIENT)
Dept: FAMILY MEDICINE | Facility: CLINIC | Age: 33
End: 2023-06-30
Payer: COMMERCIAL

## 2023-06-30 VITALS
OXYGEN SATURATION: 98 % | SYSTOLIC BLOOD PRESSURE: 130 MMHG | HEIGHT: 65 IN | BODY MASS INDEX: 48.82 KG/M2 | RESPIRATION RATE: 18 BRPM | WEIGHT: 293 LBS | TEMPERATURE: 99 F | HEART RATE: 85 BPM | DIASTOLIC BLOOD PRESSURE: 83 MMHG

## 2023-06-30 DIAGNOSIS — S30.860A TICK BITE OF BACK, INITIAL ENCOUNTER: Primary | ICD-10-CM
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
## 2023-06-30 PROCEDURE — 3044F PR MOST RECENT HEMOGLOBIN A1C LEVEL <7.0%: ICD-10-PCS | Mod: CPTII,,, | Performed by: FAMILY MEDICINE

## 2023-06-30 PROCEDURE — 99213 PR OFFICE/OUTPT VISIT, EST, LEVL III, 20-29 MIN: ICD-10-PCS | Mod: ,,, | Performed by: FAMILY MEDICINE

## 2023-06-30 PROCEDURE — 3079F DIAST BP 80-89 MM HG: CPT | Mod: CPTII,,, | Performed by: FAMILY MEDICINE

## 2023-06-30 PROCEDURE — 3008F PR BODY MASS INDEX (BMI) DOCUMENTED: ICD-10-PCS | Mod: CPTII,,, | Performed by: FAMILY MEDICINE

## 2023-06-30 PROCEDURE — 3075F SYST BP GE 130 - 139MM HG: CPT | Mod: CPTII,,, | Performed by: FAMILY MEDICINE

## 2023-06-30 PROCEDURE — 3075F PR MOST RECENT SYSTOLIC BLOOD PRESS GE 130-139MM HG: ICD-10-PCS | Mod: CPTII,,, | Performed by: FAMILY MEDICINE

## 2023-06-30 PROCEDURE — 1159F MED LIST DOCD IN RCRD: CPT | Mod: CPTII,,, | Performed by: FAMILY MEDICINE

## 2023-06-30 PROCEDURE — 3079F PR MOST RECENT DIASTOLIC BLOOD PRESSURE 80-89 MM HG: ICD-10-PCS | Mod: CPTII,,, | Performed by: FAMILY MEDICINE

## 2023-06-30 PROCEDURE — 3008F BODY MASS INDEX DOCD: CPT | Mod: CPTII,,, | Performed by: FAMILY MEDICINE

## 2023-06-30 PROCEDURE — 3044F HG A1C LEVEL LT 7.0%: CPT | Mod: CPTII,,, | Performed by: FAMILY MEDICINE

## 2023-06-30 PROCEDURE — 1159F PR MEDICATION LIST DOCUMENTED IN MEDICAL RECORD: ICD-10-PCS | Mod: CPTII,,, | Performed by: FAMILY MEDICINE

## 2023-06-30 PROCEDURE — 99213 OFFICE O/P EST LOW 20 MIN: CPT | Mod: ,,, | Performed by: FAMILY MEDICINE

## 2023-06-30 RX ORDER — DOXYCYCLINE HYCLATE 100 MG
100 TABLET ORAL 2 TIMES DAILY
Qty: 14 TABLET | Refills: 0 | Status: SHIPPED | OUTPATIENT
Start: 2023-06-30 | End: 2023-10-31

## 2023-06-30 NOTE — PROGRESS NOTES
Subjective     Patie with patient nt ID: Coby Driver is a 32 y.o. female.    Chief Complaint: Tick Removal (Patient states she got bit by a tick earlier today )    Patient says she felt something on her back was here about 30 minute she reach behind her and then noticed that it was on her left arm.  It was a tick.    Review of Systems       Objective     Physical Exam  Constitutional:       General: She is not in acute distress.  Cardiovascular:      Rate and Rhythm: Normal rate and regular rhythm.   Pulmonary:      Effort: Pulmonary effort is normal.      Breath sounds: Normal breath sounds.   Musculoskeletal:         General: No swelling or tenderness. Normal range of motion.   Skin:     Findings: Lesion (Two small superficial wounds.  One on left forearm, 1 on lower back.) present.   Neurological:      Mental Status: She is alert.          Assessment and Plan     1. Tick bite of back, initial encounter    Other orders  -     doxycycline (VIBRA-TABS) 100 MG tablet; Take 1 tablet (100 mg total) by mouth 2 (two) times daily.  Dispense: 14 tablet; Refill: 0        Doxycycline         No follow-ups on file.

## 2023-10-31 ENCOUNTER — OFFICE VISIT (OUTPATIENT)
Dept: OBSTETRICS AND GYNECOLOGY | Facility: CLINIC | Age: 33
End: 2023-10-31
Payer: COMMERCIAL

## 2023-10-31 ENCOUNTER — PATIENT MESSAGE (OUTPATIENT)
Dept: OBSTETRICS AND GYNECOLOGY | Facility: CLINIC | Age: 33
End: 2023-10-31
Payer: COMMERCIAL

## 2023-10-31 VITALS
RESPIRATION RATE: 20 BRPM | OXYGEN SATURATION: 100 % | HEART RATE: 88 BPM | BODY MASS INDEX: 48.82 KG/M2 | SYSTOLIC BLOOD PRESSURE: 120 MMHG | DIASTOLIC BLOOD PRESSURE: 80 MMHG | HEIGHT: 65 IN | WEIGHT: 293 LBS

## 2023-10-31 DIAGNOSIS — Z86.2 HISTORY OF ANEMIA: ICD-10-CM

## 2023-10-31 DIAGNOSIS — N92.6 IRREGULAR BLEEDING: Primary | ICD-10-CM

## 2023-10-31 DIAGNOSIS — N64.4 BREAST TENDERNESS: ICD-10-CM

## 2023-10-31 DIAGNOSIS — Z32.01 POSITIVE PREGNANCY TEST: ICD-10-CM

## 2023-10-31 DIAGNOSIS — Z32.01 POSITIVE PREGNANCY TEST: Primary | ICD-10-CM

## 2023-10-31 PROCEDURE — 3074F PR MOST RECENT SYSTOLIC BLOOD PRESSURE < 130 MM HG: ICD-10-PCS | Mod: CPTII,,, | Performed by: ADVANCED PRACTICE MIDWIFE

## 2023-10-31 PROCEDURE — 1159F MED LIST DOCD IN RCRD: CPT | Mod: CPTII,,, | Performed by: ADVANCED PRACTICE MIDWIFE

## 2023-10-31 PROCEDURE — 3044F HG A1C LEVEL LT 7.0%: CPT | Mod: CPTII,,, | Performed by: ADVANCED PRACTICE MIDWIFE

## 2023-10-31 PROCEDURE — 3079F PR MOST RECENT DIASTOLIC BLOOD PRESSURE 80-89 MM HG: ICD-10-PCS | Mod: CPTII,,, | Performed by: ADVANCED PRACTICE MIDWIFE

## 2023-10-31 PROCEDURE — 99213 PR OFFICE/OUTPT VISIT, EST, LEVL III, 20-29 MIN: ICD-10-PCS | Mod: S$PBB,,, | Performed by: ADVANCED PRACTICE MIDWIFE

## 2023-10-31 PROCEDURE — 3044F PR MOST RECENT HEMOGLOBIN A1C LEVEL <7.0%: ICD-10-PCS | Mod: CPTII,,, | Performed by: ADVANCED PRACTICE MIDWIFE

## 2023-10-31 PROCEDURE — 1159F PR MEDICATION LIST DOCUMENTED IN MEDICAL RECORD: ICD-10-PCS | Mod: CPTII,,, | Performed by: ADVANCED PRACTICE MIDWIFE

## 2023-10-31 PROCEDURE — 99213 OFFICE O/P EST LOW 20 MIN: CPT | Mod: S$PBB,,, | Performed by: ADVANCED PRACTICE MIDWIFE

## 2023-10-31 PROCEDURE — 3008F PR BODY MASS INDEX (BMI) DOCUMENTED: ICD-10-PCS | Mod: CPTII,,, | Performed by: ADVANCED PRACTICE MIDWIFE

## 2023-10-31 PROCEDURE — 99213 OFFICE O/P EST LOW 20 MIN: CPT | Mod: PBBFAC | Performed by: ADVANCED PRACTICE MIDWIFE

## 2023-10-31 PROCEDURE — 3008F BODY MASS INDEX DOCD: CPT | Mod: CPTII,,, | Performed by: ADVANCED PRACTICE MIDWIFE

## 2023-10-31 PROCEDURE — 3079F DIAST BP 80-89 MM HG: CPT | Mod: CPTII,,, | Performed by: ADVANCED PRACTICE MIDWIFE

## 2023-10-31 PROCEDURE — 3074F SYST BP LT 130 MM HG: CPT | Mod: CPTII,,, | Performed by: ADVANCED PRACTICE MIDWIFE

## 2023-10-31 RX ORDER — EMPAGLIFLOZIN 10 MG/1
10 TABLET, FILM COATED ORAL
COMMUNITY
Start: 2023-10-20 | End: 2024-02-08

## 2023-10-31 RX ORDER — IRON,CARBONYL/ASCORBIC ACID 100-250 MG
1 TABLET ORAL DAILY
Qty: 30 TABLET | Refills: 11 | Status: SHIPPED | OUTPATIENT
Start: 2023-10-31

## 2023-10-31 RX ORDER — POTASSIUM CHLORIDE 20 MEQ/1
20 TABLET, EXTENDED RELEASE ORAL 2 TIMES DAILY
Qty: 28 TABLET | Refills: 0 | Status: SHIPPED | OUTPATIENT
Start: 2023-10-31 | End: 2023-11-14

## 2023-10-31 RX ORDER — PHENTERMINE HYDROCHLORIDE 37.5 MG/1
37.5 TABLET ORAL
COMMUNITY
Start: 2023-10-20 | End: 2023-12-08

## 2023-10-31 NOTE — PROGRESS NOTES
Subjective     Patient ID: Coby Driver is a 33 y.o. female.    Chief Complaint: Menometrorrhagia (In for Irregular cycles. Stated that she had her IUD removed and then had a cycle in August, spotted for 2 day in September and has not had a cycle since.)    Nexplanon removed in  due to weight gain and rash.  Irregular cycles since having it removed.  Cycle in August and spotted 2 days in September no cycle since.  Wishes to get IUD.  C/o breast tenderness and faint positive pregnancy test today.     Vaginal Pain  The current episode started in the past 7 days. The problem occurs intermittently. The problem has been unchanged. The pain is mild. She is not pregnant. Associated symptoms include abdominal pain, anorexia, nausea and vomiting. The treatment provided no relief. She is not sexually active. No, her partner does not have an STD. Her menstrual history has been irregular. Her past medical history is significant for metrorrhagia and miscarriage. There is no history of an abdominal surgery, a  section, an ectopic pregnancy, endometriosis, a gynecological surgery, herpes simplex, menorrhagia or an STD.     Review of Systems   Constitutional:  Positive for unexpected weight change.   Respiratory: Negative.     Cardiovascular: Negative.    Gastrointestinal:  Positive for abdominal pain, anorexia, nausea and vomiting.   Endocrine: Negative.    Genitourinary:  Positive for menstrual irregularity and vaginal pain. Negative for menorrhagia.   Integumentary:  Positive for breast tenderness.   Neurological: Negative.    Psychiatric/Behavioral: Negative.     Breast: Positive for tenderness.         Objective     Physical Exam  Vitals reviewed.   Constitutional:       Appearance: Normal appearance.   HENT:      Head: Normocephalic.   Cardiovascular:      Rate and Rhythm: Normal rate and regular rhythm.   Pulmonary:      Effort: Pulmonary effort is normal.      Breath sounds: Normal breath sounds.    Abdominal:      General: Abdomen is flat.      Palpations: Abdomen is soft.   Musculoskeletal:         General: Normal range of motion.      Cervical back: Normal range of motion.   Skin:     General: Skin is warm and dry.   Neurological:      Mental Status: She is alert and oriented to person, place, and time.   Psychiatric:         Mood and Affect: Mood normal.         Behavior: Behavior normal.            Assessment and Plan     1. Irregular bleeding  -     CBC Auto Differential; Future; Expected date: 10/31/2023  -     Comprehensive Metabolic Panel; Future; Expected date: 10/31/2023  -     Thyroid Panel; Future; Expected date: 10/31/2023  -     HCG, Serum, Qualitative; Future; Expected date: 10/31/2023  -     Ferritin; Future; Expected date: 10/31/2023  -     Iron and TIBC; Future; Expected date: 10/31/2023    2. Breast tenderness  -     HCG, Serum, Qualitative; Future; Expected date: 10/31/2023    3. History of anemia  -     iron-vitamin C 100-250 mg, ICAR-C, (ICAR-C) 100-250 mg Tab; Take 1 tablet by mouth Daily.  Dispense: 30 tablet; Refill: 11    4. Positive pregnancy test    Other orders  -     Cancel: Device Authorization Order      RX Iron sent to pharmacy and recommends folic acid OTC  Labs today including blood pregnancy test reviewed and pregnancy test positive  IUD insertion cancelled and appt changed to New OB visit with Ultrasound  Pt notified via [employer]     Follow up in about 2 weeks (around 11/14/2023) for New OB, Ultrasound.

## 2023-11-09 ENCOUNTER — TELEPHONE (OUTPATIENT)
Dept: OBSTETRICS AND GYNECOLOGY | Facility: CLINIC | Age: 33
End: 2023-11-09
Payer: COMMERCIAL

## 2023-11-09 NOTE — TELEPHONE ENCOUNTER
----- Message from Julia Díaz sent at 11/9/2023  2:33 PM CST -----   Please call back # [phone]. She went to the ED and GP with headaches and chest aches. She was in a fib on EKG

## 2023-11-09 NOTE — TELEPHONE ENCOUNTER
Patient states that she has a referral with cardiology and it is ok for her to take a low dose asa daily.

## 2023-11-14 ENCOUNTER — HOSPITAL ENCOUNTER (OUTPATIENT)
Dept: RADIOLOGY | Facility: HOSPITAL | Age: 33
Discharge: HOME | End: 2023-11-14
Attending: ADVANCED PRACTICE MIDWIFE
Payer: COMMERCIAL

## 2023-11-14 ENCOUNTER — INITIAL PRENATAL (OUTPATIENT)
Dept: OBSTETRICS AND GYNECOLOGY | Facility: CLINIC | Age: 33
End: 2023-11-14
Payer: COMMERCIAL

## 2023-11-14 VITALS
DIASTOLIC BLOOD PRESSURE: 84 MMHG | SYSTOLIC BLOOD PRESSURE: 127 MMHG | BODY MASS INDEX: 49.76 KG/M2 | WEIGHT: 293 LBS | HEART RATE: 84 BPM

## 2023-11-14 DIAGNOSIS — O10.919 CHRONIC HYPERTENSION IN PREGNANCY: ICD-10-CM

## 2023-11-14 DIAGNOSIS — Z32.01 POSITIVE PREGNANCY TEST: ICD-10-CM

## 2023-11-14 DIAGNOSIS — E66.8 OTHER OBESITY AFFECTING PREGNANCY IN FIRST TRIMESTER: ICD-10-CM

## 2023-11-14 DIAGNOSIS — Z11.3 SCREEN FOR STD (SEXUALLY TRANSMITTED DISEASE): ICD-10-CM

## 2023-11-14 DIAGNOSIS — Z3A.01 LESS THAN 8 WEEKS GESTATION OF PREGNANCY: ICD-10-CM

## 2023-11-14 DIAGNOSIS — O99.211 OTHER OBESITY AFFECTING PREGNANCY IN FIRST TRIMESTER: ICD-10-CM

## 2023-11-14 DIAGNOSIS — Z32.01 POSITIVE PREGNANCY TEST: Primary | ICD-10-CM

## 2023-11-14 PROCEDURE — 0501F PRENATAL FLOW SHEET: CPT | Mod: S$PBB,,, | Performed by: ADVANCED PRACTICE MIDWIFE

## 2023-11-14 PROCEDURE — 76801 US OB <14 WEEKS TRANSABDOM, SINGLE GESTATION: ICD-10-PCS | Mod: 26,,, | Performed by: STUDENT IN AN ORGANIZED HEALTH CARE EDUCATION/TRAINING PROGRAM

## 2023-11-14 PROCEDURE — 87491 CHLAMYDIA/GONORRHOEAE(GC), PCR: ICD-10-PCS | Mod: ,,, | Performed by: CLINICAL MEDICAL LABORATORY

## 2023-11-14 PROCEDURE — 87591 N.GONORRHOEAE DNA AMP PROB: CPT | Mod: ,,, | Performed by: CLINICAL MEDICAL LABORATORY

## 2023-11-14 PROCEDURE — 87086 URINE CULTURE/COLONY COUNT: CPT | Mod: ,,, | Performed by: CLINICAL MEDICAL LABORATORY

## 2023-11-14 PROCEDURE — G0481 OB DRUG SCREEN DEFINITIVE, URINE: ICD-10-PCS | Mod: ,,, | Performed by: CLINICAL MEDICAL LABORATORY

## 2023-11-14 PROCEDURE — 87086 CULTURE, URINE: ICD-10-PCS | Mod: ,,, | Performed by: CLINICAL MEDICAL LABORATORY

## 2023-11-14 PROCEDURE — 0501F PR INTITIAL PRENATAL CARE VISIT W/FLOW SHEET: ICD-10-PCS | Mod: S$PBB,,, | Performed by: ADVANCED PRACTICE MIDWIFE

## 2023-11-14 PROCEDURE — 87591 CHLAMYDIA/GONORRHOEAE(GC), PCR: ICD-10-PCS | Mod: ,,, | Performed by: CLINICAL MEDICAL LABORATORY

## 2023-11-14 PROCEDURE — 87661 TRICHOMONAS VAGINALIS AMPLIF: CPT | Mod: ,,, | Performed by: CLINICAL MEDICAL LABORATORY

## 2023-11-14 PROCEDURE — G0481 DRUG TEST DEF 8-14 CLASSES: HCPCS | Mod: ,,, | Performed by: CLINICAL MEDICAL LABORATORY

## 2023-11-14 PROCEDURE — 99213 OFFICE O/P EST LOW 20 MIN: CPT | Mod: PBBFAC,25 | Performed by: ADVANCED PRACTICE MIDWIFE

## 2023-11-14 PROCEDURE — 76801 OB US < 14 WKS SINGLE FETUS: CPT | Mod: TC

## 2023-11-14 PROCEDURE — 87661 TRICHOMONAS VAGINALIS BY PCR: ICD-10-PCS | Mod: ,,, | Performed by: CLINICAL MEDICAL LABORATORY

## 2023-11-14 PROCEDURE — 76801 OB US < 14 WKS SINGLE FETUS: CPT | Mod: 26,,, | Performed by: STUDENT IN AN ORGANIZED HEALTH CARE EDUCATION/TRAINING PROGRAM

## 2023-11-14 PROCEDURE — 87491 CHLMYD TRACH DNA AMP PROBE: CPT | Mod: ,,, | Performed by: CLINICAL MEDICAL LABORATORY

## 2023-11-14 RX ORDER — NITROFURANTOIN 25; 75 MG/1; MG/1
100 CAPSULE ORAL
COMMUNITY
End: 2024-02-08

## 2023-11-14 RX ORDER — LABETALOL 100 MG/1
100 TABLET, FILM COATED ORAL 2 TIMES DAILY
Qty: 60 TABLET | Refills: 11 | Status: SHIPPED | OUTPATIENT
Start: 2023-11-14 | End: 2024-11-13

## 2023-11-14 RX ORDER — FAMOTIDINE 20 MG/1
20 TABLET, FILM COATED ORAL 2 TIMES DAILY
Qty: 60 TABLET | Refills: 11 | Status: SHIPPED | OUTPATIENT
Start: 2023-11-14 | End: 2023-12-08

## 2023-11-14 NOTE — PROGRESS NOTES
Subjective:      Coby Driver is being seen today for her first obstetrical visit.  This is not a planned pregnancy. She is at  7w 4d gestation. Her obstetrical history is significant for  obesity, chronic HTN . Relationship with FOB: spouse, living together. Patient does intend to breast feed.  Saw Dr. Doll at [facility] and is current wearing a holter monitor.  Began having chest pain/pressure last week.   C/O acid indigestion and nausea, no vomiting currently.   Denies vaginal bleeding, abd pain or cramping.  Pregnancy hx reviewed.    Menstrual History:  OB History          4    Para   3    Term   2       1    AB        Living   3         SAB        IAB        Ectopic        Multiple        Live Births   3                  Patient's last menstrual period was 2023.  EDC by LMP 23  US today  FHTs 127, single IUP, CL 3.4cm       The following portions of the patient's history were reviewed and updated as appropriate: allergies, current medications, past family history, past medical history, past social history, past surgical history, and problem list.    Review of Systems  Pertinent items are noted in HPI.      Objective:      /84   Pulse 84   Wt 135.6 kg (299 lb)   LMP 2023 Comment: cycle last from february to may  BMI 49.76 kg/m²   General appearance: alert, appears stated age, cooperative, and no distress  Head: Normocephalic, without obvious abnormality, atraumatic  Lungs: clear to auscultation bilaterally and even/unlabored  Heart: regular rate and rhythm  Abdomen: soft, non-tender  Extremities: extremities normal, atraumatic, no cyanosis or edema  Skin: Skin color, texture, turgor normal. No rashes or lesions      Assessment:     Obesity in Pregnancy  Chronic Hypertension   Pregnancy at 7 and 4/7 weeks        Plan:      Initial labs drawn.  Bryanna Screen discussed.  Prenatal vitamins.  Problem list reviewed and updated.  New OB booklet given to pt to review.   Discussed midwifery care in collaboration with Dr Camejo.  Reviewed healthy diet, exercise during pregnancy and weight gain recommendations.  Safe OTC med list given and reviewed.  Discussed danger s/s to report including bleeding precautions.    Follow up in 4 weeks.

## 2023-11-15 ENCOUNTER — PATIENT MESSAGE (OUTPATIENT)
Dept: OBSTETRICS AND GYNECOLOGY | Facility: CLINIC | Age: 33
End: 2023-11-15
Payer: COMMERCIAL

## 2023-11-15 DIAGNOSIS — A56.8 CHLAMYDIA TRACHOMATIS INFECTION IN MOTHER DURING FIRST TRIMESTER OF PREGNANCY: Primary | ICD-10-CM

## 2023-11-15 DIAGNOSIS — O98.311 CHLAMYDIA TRACHOMATIS INFECTION IN MOTHER DURING FIRST TRIMESTER OF PREGNANCY: Primary | ICD-10-CM

## 2023-11-15 LAB
CHLAMYDIA BY PCR: POSITIVE
N. GONORRHOEAE (GC) BY PCR: NEGATIVE
TRICHOMONAS NAT: NEGATIVE

## 2023-11-15 RX ORDER — AZITHROMYCIN 500 MG/1
1000 TABLET, FILM COATED ORAL DAILY
Qty: 2 TABLET | Refills: 0 | Status: SHIPPED | OUTPATIENT
Start: 2023-11-15 | End: 2023-11-16

## 2023-11-16 LAB — UA COMPLETE W REFLEX CULTURE PNL UR: NORMAL

## 2023-11-17 LAB
7-AMINOCLONAZEPAM, URINE (RUSH): NEGATIVE 25 NG/ML
A-HYDROXYALPRAZOLAM, URINE (RUSH): NEGATIVE 25 NG/ML
AMITRIPTYLINE, URINE (RUSH): NEGATIVE 25 NG/ML
BENZOYLECGONINE, URINE (RUSH): NEGATIVE 100 NG/ML
CARISOPRODOL, URINE (RUSH): NEGATIVE 100 NG/ML
CODEINE, URINE (RUSH): NEGATIVE 25 NG/ML
CREAT UR-MCNC: 192 MG/DL (ref 28–219)
EDDP, URINE (RUSH): NEGATIVE 25 NG/ML
HYDROCODONE, URINE (RUSH): NEGATIVE 25 NG/ML
HYDROMORPHONE, URINE (RUSH): NEGATIVE 25 NG/ML
LORAZEPAM, URINE (RUSH): NEGATIVE 25 NG/ML
MEPROBAMATE, URINE (RUSH): NEGATIVE 100 NG/ML
METHADONE UR QL SCN: NEGATIVE 25 NG/ML
METHAMPHET UR QL SCN: NEGATIVE
MORPHINE, URINE (RUSH): NEGATIVE 25 NG/ML
NORDIAZEPAM, URINE (RUSH): NEGATIVE 25 NG/ML
NORHYDROCODONE, URINE (RUSH): NEGATIVE 50 NG/ML
NOROXYCODONE HCL, URINE (RUSH): NEGATIVE 50 NG/ML
OXAZEPAM, URINE (RUSH): NEGATIVE 25 NG/ML
OXYCODONE UR QL SCN: NEGATIVE 25 NG/ML
OXYMORPHONE, URINE (RUSH): NEGATIVE 25 NG/ML
PH UR STRIP: 6 PH UNITS
SP GR UR STRIP: 1.03
TEMAZEPAM, URINE (RUSH): NEGATIVE 25 NG/ML
THC-COOH, URINE (RUSH): NEGATIVE 25 NG/ML

## 2023-11-22 ENCOUNTER — HOSPITAL ENCOUNTER (EMERGENCY)
Facility: HOSPITAL | Age: 33
Discharge: HOME | End: 2023-11-22
Payer: COMMERCIAL

## 2023-11-22 ENCOUNTER — TELEPHONE (OUTPATIENT)
Dept: OBSTETRICS AND GYNECOLOGY | Facility: CLINIC | Age: 33
End: 2023-11-22
Payer: COMMERCIAL

## 2023-11-22 VITALS
OXYGEN SATURATION: 99 % | HEART RATE: 89 BPM | TEMPERATURE: 98 F | RESPIRATION RATE: 18 BRPM | DIASTOLIC BLOOD PRESSURE: 89 MMHG | BODY MASS INDEX: 48.82 KG/M2 | WEIGHT: 293 LBS | SYSTOLIC BLOOD PRESSURE: 135 MMHG | HEIGHT: 65 IN

## 2023-11-22 DIAGNOSIS — N92.6 IRREGULAR UTERINE BLEEDING: ICD-10-CM

## 2023-11-22 DIAGNOSIS — O03.9 MISCARRIAGE: Primary | ICD-10-CM

## 2023-11-22 LAB
BASOPHILS # BLD AUTO: 0.02 K/UL (ref 0–0.2)
BASOPHILS NFR BLD AUTO: 0.4 % (ref 0–1)
BILIRUB UR QL STRIP: NEGATIVE
CLARITY UR: ABNORMAL
COLOR UR: ABNORMAL
DIFFERENTIAL METHOD BLD: ABNORMAL
EOSINOPHIL # BLD AUTO: 0.03 K/UL (ref 0–0.5)
EOSINOPHIL NFR BLD AUTO: 0.7 % (ref 1–4)
ERYTHROCYTE [DISTWIDTH] IN BLOOD BY AUTOMATED COUNT: 17.7 % (ref 11.5–14.5)
GLUCOSE UR STRIP-MCNC: NORMAL MG/DL
HCG SERPL-ACNC: 4220 MIU/ML
HCT VFR BLD AUTO: 31.1 % (ref 38–47)
HGB BLD-MCNC: 9.5 G/DL (ref 12–16)
IMM GRANULOCYTES # BLD AUTO: 0.01 K/UL (ref 0–0.04)
IMM GRANULOCYTES NFR BLD: 0.2 % (ref 0–0.4)
KETONES UR STRIP-SCNC: 10 MG/DL
LEUKOCYTE ESTERASE UR QL STRIP: ABNORMAL
LYMPHOCYTES # BLD AUTO: 1.63 K/UL (ref 1–4.8)
LYMPHOCYTES NFR BLD AUTO: 36.5 % (ref 27–41)
MCH RBC QN AUTO: 23.6 PG (ref 27–31)
MCHC RBC AUTO-ENTMCNC: 30.5 G/DL (ref 32–36)
MCV RBC AUTO: 77.4 FL (ref 80–96)
MONOCYTES # BLD AUTO: 0.38 K/UL (ref 0–0.8)
MONOCYTES NFR BLD AUTO: 8.5 % (ref 2–6)
MPC BLD CALC-MCNC: 10.1 FL (ref 9.4–12.4)
MUCOUS, UA: ABNORMAL /LPF
NEUTROPHILS # BLD AUTO: 2.4 K/UL (ref 1.8–7.7)
NEUTROPHILS NFR BLD AUTO: 53.7 % (ref 53–65)
NITRITE UR QL STRIP: NEGATIVE
NRBC # BLD AUTO: 0 X10E3/UL
NRBC, AUTO (.00): 0 %
PH UR STRIP: 5.5 PH UNITS
PLATELET # BLD AUTO: 386 K/UL (ref 150–400)
PROT UR QL STRIP: 50
RBC # BLD AUTO: 4.02 M/UL (ref 4.2–5.4)
RBC # UR STRIP: ABNORMAL /UL
RBC #/AREA URNS HPF: 3 /HPF
SP GR UR STRIP: 1.03
SQUAMOUS #/AREA URNS LPF: ABNORMAL /HPF
UROBILINOGEN UR STRIP-ACNC: NORMAL MG/DL
WBC # BLD AUTO: 4.47 K/UL (ref 4.5–11)
WBC #/AREA URNS HPF: 5 /HPF

## 2023-11-22 PROCEDURE — 81001 URINALYSIS AUTO W/SCOPE: CPT

## 2023-11-22 PROCEDURE — 99284 EMERGENCY DEPT VISIT MOD MDM: CPT | Mod: GC,,, | Performed by: FAMILY MEDICINE

## 2023-11-22 PROCEDURE — 99284 PR EMERGENCY DEPT VISIT,LEVEL IV: ICD-10-PCS | Mod: GC,,, | Performed by: FAMILY MEDICINE

## 2023-11-22 PROCEDURE — 84702 CHORIONIC GONADOTROPIN TEST: CPT

## 2023-11-22 PROCEDURE — 63600175 PHARM REV CODE 636 W HCPCS

## 2023-11-22 PROCEDURE — 96372 THER/PROPH/DIAG INJ SC/IM: CPT

## 2023-11-22 PROCEDURE — 99284 EMERGENCY DEPT VISIT MOD MDM: CPT | Mod: 25

## 2023-11-22 PROCEDURE — 85025 COMPLETE CBC W/AUTO DIFF WBC: CPT

## 2023-11-22 RX ORDER — DOXYCYCLINE 100 MG/1
100 CAPSULE ORAL 2 TIMES DAILY
Qty: 20 CAPSULE | Refills: 0 | Status: SHIPPED | OUTPATIENT
Start: 2023-11-22 | End: 2023-12-02

## 2023-11-22 RX ORDER — CEFTRIAXONE 1 G/1
0.25 INJECTION, POWDER, FOR SOLUTION INTRAMUSCULAR; INTRAVENOUS
Status: COMPLETED | OUTPATIENT
Start: 2023-11-22 | End: 2023-11-22

## 2023-11-22 RX ORDER — KETOROLAC TROMETHAMINE 10 MG/1
10 TABLET, FILM COATED ORAL EVERY 6 HOURS
Qty: 20 TABLET | Refills: 0 | Status: SHIPPED | OUTPATIENT
Start: 2023-11-22 | End: 2023-11-27

## 2023-11-22 RX ADMIN — CEFTRIAXONE 0.25 G: 1 INJECTION, POWDER, FOR SOLUTION INTRAMUSCULAR; INTRAVENOUS at 04:11

## 2023-11-22 NOTE — ED TRIAGE NOTES
Pt presents to ed via pov, c/o spotting yesterday but today she has been passing blood clots. States she has went through 2 pads today.

## 2023-11-22 NOTE — ED PROVIDER NOTES
Encounter Date: 2023       History     Chief Complaint   Patient presents with    Vaginal Bleeding     The patient is a 32yo  female who presents with c/o vaginal bleeding since early this morning. The patient says she is around 8 weeks pregnant. She says she saw Stephanie Gilesant recently for routine prenatal care and had an US that showed a fetal heartbeat. The patient also tested positive for chlamydia but says she never took the antibiotics she was sent.       Review of patient's allergies indicates:   Allergen Reactions    Pimecrolimus      Past Medical History:   Diagnosis Date    Acne vulgaris     Alopecia     Anemia      Past Surgical History:   Procedure Laterality Date    BREAST SURGERY      GASTRIC RESTRICTION SURGERY      REDUCTION OF BOTH BREASTS Bilateral     TONSILLECTOMY Bilateral      Family History   Problem Relation Age of Onset    Heart failure Father     Diabetes Mother     Hypertension Mother     Breast cancer Neg Hx     Colon cancer Neg Hx     Ovarian cancer Neg Hx      Social History     Tobacco Use    Smoking status: Never    Smokeless tobacco: Never   Substance Use Topics    Alcohol use: Never    Drug use: Never     Review of Systems   Constitutional:  Negative for chills and fever.   Respiratory:  Negative for shortness of breath.    Cardiovascular:  Negative for chest pain, palpitations and leg swelling.   Gastrointestinal:  Negative for abdominal pain, nausea and vomiting.   Genitourinary:  Positive for vaginal bleeding. Negative for dysuria and vaginal discharge.   Musculoskeletal:  Negative for arthralgias.   Neurological:  Negative for syncope.   Psychiatric/Behavioral:  Negative for confusion.        Physical Exam     Initial Vitals [23 1403]   BP Pulse Resp Temp SpO2   135/89 89 18 97.9 °F (36.6 °C) 99 %      MAP       --         Physical Exam    Constitutional: She appears well-developed and well-nourished.   HENT:   Head: Normocephalic and atraumatic.   Eyes:  Conjunctivae and EOM are normal. Pupils are equal, round, and reactive to light.   Cardiovascular:  Normal rate, regular rhythm, normal heart sounds and intact distal pulses.           Pulmonary/Chest: Breath sounds normal.   Abdominal: Abdomen is soft. Bowel sounds are normal.   Genitourinary:    Pelvic exam was performed with patient supine.      Vaginal bleeding present.   There is bleeding in the vagina.     Neurological: She is alert and oriented to person, place, and time.       Medical Screening Exam   See Full Note    ED Course   Procedures  Labs Reviewed   URINALYSIS, REFLEX TO URINE CULTURE - Abnormal; Notable for the following components:       Result Value    Leukocytes, UA Small (*)     Protein, UA 50 (*)     Ketones, UA 10 (*)     Blood, UA Large (*)     Specific Gravity, UA 1.032 (*)     All other components within normal limits   CBC WITH DIFFERENTIAL - Abnormal; Notable for the following components:    WBC 4.47 (*)     RBC 4.02 (*)     Hemoglobin 9.5 (*)     Hematocrit 31.1 (*)     MCV 77.4 (*)     MCH 23.6 (*)     MCHC 30.5 (*)     RDW 17.7 (*)     Monocytes % 8.5 (*)     Eosinophils % 0.7 (*)     All other components within normal limits   URINALYSIS, MICROSCOPIC - Abnormal; Notable for the following components:    Squamous Epithelial Cells, UA Few (*)     Mucous Occasional (*)     All other components within normal limits   CBC W/ AUTO DIFFERENTIAL    Narrative:     The following orders were created for panel order CBC auto differential.  Procedure                               Abnormality         Status                     ---------                               -----------         ------                     CBC with Differential[[id number]]       Abnormal            Final result                 Please view results for these tests on the individual orders.   HCG, TOTAL, QUANTITATIVE          Imaging Results              US OB <14 Wks, TransAbd, Single Gestation (Final result)  Result time  11/22/23 16:12:54      Final result by Stoney Arenas DO (11/22/23 16:12:54)                   Impression:      No gestational sac or fetal pole visualized within the uterus at this time.  Please note that gestational sac and fetal pole were visualized on comparison ultrasound dated November 14, 2023.    Point of Service: [facility]      Electronically signed by: Stoney Arenas  Date:    11/22/2023  Time:    16:12               Narrative:    EXAMINATION:  US OB <14 WEEKS TRANSABDOM, SINGLE GESTATION    CLINICAL HISTORY:  Irregular menstruation, unspecified    COMPARISON:  November 14, 2022    TECHNIQUE:  Multiple longitudinal and transverse sonographic images of the maternal abdomen/pelvis were obtained with transabdominal probe for evaluation of pregnancy.    FINDINGS:  The uterus measures 8.8 x 7.9 x 5.6 cm. No gestational sac or fetal pole visualized within the uterus at this time.  Please note that gestational sac and fetal pole were visualized on comparison ultrasound dated November 14, 2023. Right ovary measurement of 2.1 x 1.9 x 1.8 cm given. Left ovary measurement of 2.7 x 1.9 x 2.0 cm given.                                       Medications   cefTRIAXone injection 0.25 g (0.25 g Intramuscular Given 11/22/23 1638)     Medical Decision Making  Amount and/or Complexity of Data Reviewed  Labs: ordered.  Radiology: ordered.    Risk  Prescription drug management.                                   Clinical Impression:   Final diagnoses:  [O03.9] Miscarriage (Primary)        ED Disposition Condition    Discharge Stable          ED Prescriptions       Medication Sig Dispense Start Date End Date Auth. Provider    doxycycline (VIBRAMYCIN) 100 MG Cap Take 1 capsule (100 mg total) by mouth 2 (two) times daily. for 10 days 20 capsule 11/22/2023 12/2/2023 Thuy De León MD    ketorolac (TORADOL) 10 mg tablet Take 1 tablet (10 mg total) by mouth every 6 (six) hours. for 5 days 20 tablet 11/22/2023  11/27/2023 Thuy De León MD          Follow-up Information       Follow up With Specialties Details Why Contact Info    Bozena Schumacher NP Family Medicine Schedule an appointment as soon as possible for a visit   [address] [place] [address] [place] [address]  [phone]               Thuy De León MD  Resident  11/22/23 1074

## 2023-11-22 NOTE — TELEPHONE ENCOUNTER
----- Message from Gnei Hunter sent at 11/22/2023 10:31 AM CST -----  Regarding: OB bleeding heavy  8 week OB is bleeding bright red with blood clots. Call [phone].

## 2023-11-24 ENCOUNTER — TELEPHONE (OUTPATIENT)
Dept: EMERGENCY MEDICINE | Facility: HOSPITAL | Age: 33
End: 2023-11-24
Payer: COMMERCIAL

## 2023-12-04 ENCOUNTER — OFFICE VISIT (OUTPATIENT)
Dept: OBSTETRICS AND GYNECOLOGY | Facility: CLINIC | Age: 33
End: 2023-12-04
Payer: COMMERCIAL

## 2023-12-04 VITALS
WEIGHT: 285 LBS | RESPIRATION RATE: 19 BRPM | BODY MASS INDEX: 47.48 KG/M2 | HEART RATE: 88 BPM | SYSTOLIC BLOOD PRESSURE: 118 MMHG | DIASTOLIC BLOOD PRESSURE: 78 MMHG | HEIGHT: 65 IN | OXYGEN SATURATION: 100 %

## 2023-12-04 DIAGNOSIS — Z30.09 GENERAL COUNSELING AND ADVICE ON CONTRACEPTIVE MANAGEMENT: ICD-10-CM

## 2023-12-04 DIAGNOSIS — E66.01 CLASS 3 SEVERE OBESITY WITH SERIOUS COMORBIDITY AND BODY MASS INDEX (BMI) OF 45.0 TO 49.9 IN ADULT, UNSPECIFIED OBESITY TYPE: ICD-10-CM

## 2023-12-04 DIAGNOSIS — O02.1 MISSED ABORTION: Primary | ICD-10-CM

## 2023-12-04 DIAGNOSIS — I10 CHRONIC HYPERTENSION: ICD-10-CM

## 2023-12-04 PROCEDURE — 99213 OFFICE O/P EST LOW 20 MIN: CPT | Mod: S$PBB,,, | Performed by: ADVANCED PRACTICE MIDWIFE

## 2023-12-04 PROCEDURE — 3078F PR MOST RECENT DIASTOLIC BLOOD PRESSURE < 80 MM HG: ICD-10-PCS | Mod: CPTII,,, | Performed by: ADVANCED PRACTICE MIDWIFE

## 2023-12-04 PROCEDURE — 3074F PR MOST RECENT SYSTOLIC BLOOD PRESSURE < 130 MM HG: ICD-10-PCS | Mod: CPTII,,, | Performed by: ADVANCED PRACTICE MIDWIFE

## 2023-12-04 PROCEDURE — 1159F MED LIST DOCD IN RCRD: CPT | Mod: CPTII,,, | Performed by: ADVANCED PRACTICE MIDWIFE

## 2023-12-04 PROCEDURE — 99214 OFFICE O/P EST MOD 30 MIN: CPT | Mod: PBBFAC | Performed by: ADVANCED PRACTICE MIDWIFE

## 2023-12-04 PROCEDURE — 3044F PR MOST RECENT HEMOGLOBIN A1C LEVEL <7.0%: ICD-10-PCS | Mod: CPTII,,, | Performed by: ADVANCED PRACTICE MIDWIFE

## 2023-12-04 PROCEDURE — 99213 PR OFFICE/OUTPT VISIT, EST, LEVL III, 20-29 MIN: ICD-10-PCS | Mod: S$PBB,,, | Performed by: ADVANCED PRACTICE MIDWIFE

## 2023-12-04 PROCEDURE — 1159F PR MEDICATION LIST DOCUMENTED IN MEDICAL RECORD: ICD-10-PCS | Mod: CPTII,,, | Performed by: ADVANCED PRACTICE MIDWIFE

## 2023-12-04 PROCEDURE — 3044F HG A1C LEVEL LT 7.0%: CPT | Mod: CPTII,,, | Performed by: ADVANCED PRACTICE MIDWIFE

## 2023-12-04 PROCEDURE — 3078F DIAST BP <80 MM HG: CPT | Mod: CPTII,,, | Performed by: ADVANCED PRACTICE MIDWIFE

## 2023-12-04 PROCEDURE — 3008F PR BODY MASS INDEX (BMI) DOCUMENTED: ICD-10-PCS | Mod: CPTII,,, | Performed by: ADVANCED PRACTICE MIDWIFE

## 2023-12-04 PROCEDURE — 3074F SYST BP LT 130 MM HG: CPT | Mod: CPTII,,, | Performed by: ADVANCED PRACTICE MIDWIFE

## 2023-12-04 PROCEDURE — 3008F BODY MASS INDEX DOCD: CPT | Mod: CPTII,,, | Performed by: ADVANCED PRACTICE MIDWIFE

## 2023-12-04 NOTE — PROGRESS NOTES
Subjective     Patient ID: Coby Driver is a 33 y.o. female.    Chief Complaint: Follow-up (In for a F/U for missed AB. Denies any problems at this time.)    Follow up missed AB on   Denies any further bleeding.  Wants contraception but her  does not want her on birth control     Gynecologic Exam  The patient's pertinent negatives include no genital itching, genital lesions, genital odor, genital rash, missed menses, pelvic pain, vaginal bleeding or vaginal discharge. The pain is mild. She is not pregnant. Associated symptoms include abdominal pain. There has been no bleeding. She has not been passing clots. She is sexually active. No, her partner does not have an STD. Her menstrual history has been regular. Her past medical history is significant for miscarriage. There is no history of an abdominal surgery, a  section, an ectopic pregnancy, endometriosis, a gynecological surgery, herpes simplex, menorrhagia, metrorrhagia, ovarian cysts, perineal abscess, PID, an STD, a terminated pregnancy or vaginosis.     Review of Systems   Constitutional: Negative.    HENT: Negative.     Eyes: Negative.    Respiratory: Negative.     Cardiovascular: Negative.    Gastrointestinal:  Positive for abdominal pain.   Endocrine: Negative.    Genitourinary: Negative.  Negative for menorrhagia, missed menses, pelvic pain and vaginal discharge.   Musculoskeletal: Negative.    Integumentary:  Negative.   Allergic/Immunologic: Negative.    Neurological: Negative.    Psychiatric/Behavioral: Negative.            Objective     Physical Exam  Vitals reviewed.   Constitutional:       Appearance: Normal appearance.   HENT:      Head: Normocephalic.   Cardiovascular:      Rate and Rhythm: Normal rate and regular rhythm.   Pulmonary:      Effort: Pulmonary effort is normal.      Breath sounds: Normal breath sounds.   Abdominal:      General: Abdomen is flat.      Palpations: Abdomen is soft.   Musculoskeletal:          General: Normal range of motion.      Cervical back: Normal range of motion.   Skin:     General: Skin is warm and dry.   Neurological:      Mental Status: She is alert and oriented to person, place, and time.   Psychiatric:         Mood and Affect: Mood normal.         Behavior: Behavior normal.            Assessment and Plan     1. Missed     2. Chronic hypertension    3. Class 3 severe obesity with serious comorbidity and body mass index (BMI) of 45.0 to 49.9 in adult, unspecified obesity type    4. General counseling and advice on contraceptive management        Discussed birth control options including Ocs, depo, Nexplanon and IUDs. Discussed R/B to each method.  Pt undecided at this time.       Follow up in about 4 weeks (around 2024) for Follow up Missed AB, contraception.

## 2023-12-08 ENCOUNTER — OFFICE VISIT (OUTPATIENT)
Dept: FAMILY MEDICINE | Facility: CLINIC | Age: 33
End: 2023-12-08
Payer: COMMERCIAL

## 2023-12-08 VITALS
SYSTOLIC BLOOD PRESSURE: 132 MMHG | TEMPERATURE: 98 F | DIASTOLIC BLOOD PRESSURE: 82 MMHG | HEIGHT: 65 IN | BODY MASS INDEX: 47.98 KG/M2 | OXYGEN SATURATION: 100 % | HEART RATE: 70 BPM | RESPIRATION RATE: 18 BRPM | WEIGHT: 288 LBS

## 2023-12-08 DIAGNOSIS — R10.9 ABDOMINAL PAIN, UNSPECIFIED ABDOMINAL LOCATION: ICD-10-CM

## 2023-12-08 DIAGNOSIS — R11.0 NAUSEA: Primary | ICD-10-CM

## 2023-12-08 LAB
BILIRUB SERPL-MCNC: NORMAL MG/DL
BLOOD URINE, POC: NORMAL
COLOR, POC UA: NORMAL
GLUCOSE UR QL STRIP: NORMAL
KETONES UR QL STRIP: NORMAL
LEUKOCYTE ESTERASE URINE, POC: NORMAL
NITRITE, POC UA: NORMAL
PH, POC UA: 6
PROTEIN, POC: 30
SPECIFIC GRAVITY, POC UA: >1.03
UROBILINOGEN, POC UA: 1

## 2023-12-08 PROCEDURE — 81003 URINALYSIS AUTO W/O SCOPE: CPT | Mod: QW,,, | Performed by: NURSE PRACTITIONER

## 2023-12-08 PROCEDURE — 3079F PR MOST RECENT DIASTOLIC BLOOD PRESSURE 80-89 MM HG: ICD-10-PCS | Mod: CPTII,,, | Performed by: NURSE PRACTITIONER

## 2023-12-08 PROCEDURE — 3044F HG A1C LEVEL LT 7.0%: CPT | Mod: CPTII,,, | Performed by: NURSE PRACTITIONER

## 2023-12-08 PROCEDURE — 3044F PR MOST RECENT HEMOGLOBIN A1C LEVEL <7.0%: ICD-10-PCS | Mod: CPTII,,, | Performed by: NURSE PRACTITIONER

## 2023-12-08 PROCEDURE — 99213 OFFICE O/P EST LOW 20 MIN: CPT | Mod: ,,, | Performed by: NURSE PRACTITIONER

## 2023-12-08 PROCEDURE — 3075F SYST BP GE 130 - 139MM HG: CPT | Mod: CPTII,,, | Performed by: NURSE PRACTITIONER

## 2023-12-08 PROCEDURE — 3008F PR BODY MASS INDEX (BMI) DOCUMENTED: ICD-10-PCS | Mod: CPTII,,, | Performed by: NURSE PRACTITIONER

## 2023-12-08 PROCEDURE — 3079F DIAST BP 80-89 MM HG: CPT | Mod: CPTII,,, | Performed by: NURSE PRACTITIONER

## 2023-12-08 PROCEDURE — 1159F MED LIST DOCD IN RCRD: CPT | Mod: CPTII,,, | Performed by: NURSE PRACTITIONER

## 2023-12-08 PROCEDURE — 1159F PR MEDICATION LIST DOCUMENTED IN MEDICAL RECORD: ICD-10-PCS | Mod: CPTII,,, | Performed by: NURSE PRACTITIONER

## 2023-12-08 PROCEDURE — 3075F PR MOST RECENT SYSTOLIC BLOOD PRESS GE 130-139MM HG: ICD-10-PCS | Mod: CPTII,,, | Performed by: NURSE PRACTITIONER

## 2023-12-08 PROCEDURE — 81003 POCT URINALYSIS W/O SCOPE: ICD-10-PCS | Mod: QW,,, | Performed by: NURSE PRACTITIONER

## 2023-12-08 PROCEDURE — 1160F RVW MEDS BY RX/DR IN RCRD: CPT | Mod: CPTII,,, | Performed by: NURSE PRACTITIONER

## 2023-12-08 PROCEDURE — 87086 CULTURE, URINE: ICD-10-PCS | Mod: ,,, | Performed by: CLINICAL MEDICAL LABORATORY

## 2023-12-08 PROCEDURE — 99213 PR OFFICE/OUTPT VISIT, EST, LEVL III, 20-29 MIN: ICD-10-PCS | Mod: ,,, | Performed by: NURSE PRACTITIONER

## 2023-12-08 PROCEDURE — 87086 URINE CULTURE/COLONY COUNT: CPT | Mod: ,,, | Performed by: CLINICAL MEDICAL LABORATORY

## 2023-12-08 PROCEDURE — 3008F BODY MASS INDEX DOCD: CPT | Mod: CPTII,,, | Performed by: NURSE PRACTITIONER

## 2023-12-08 PROCEDURE — 1160F PR REVIEW ALL MEDS BY PRESCRIBER/CLIN PHARMACIST DOCUMENTED: ICD-10-PCS | Mod: CPTII,,, | Performed by: NURSE PRACTITIONER

## 2023-12-08 NOTE — PROGRESS NOTES
Clinic Note    Coby Driver is a 33 y.o. female     Chief Complaint:   Chief Complaint   Patient presents with    Breast Problem     Patient states her breast are sensitive to the touch.     Nausea    Abdominal Pain     Patient recently had a miscarriage.         Subjective:    Patient complains of abdominal pain, nausea, and breast tenderness. Denies dysuria. Admits to loose stools once but no constipation. Denies vomiting. Patient reports she had a miscarriage 3 weeks ago. Denies requiring d&c. Patient states she has not had a menstrual cycle since. Patient admits to mild bloody discharge but assumed post miscarriage. Patient admits to being sexually active since miscarriage. Denies fever. Saw PAULINA Corcoran last week.           Allergies:   Review of patient's allergies indicates:   Allergen Reactions    Pimecrolimus         Past Medical History:  Past Medical History:   Diagnosis Date    Acne vulgaris     Alopecia     Anemia         Current Medications:    Current Outpatient Medications:     iron, carbonyl, (ICAR) 15 mg/1.25 mL Susp, Take by mouth., Disp: , Rfl:     iron-vitamin C 100-250 mg, ICAR-C, (ICAR-C) 100-250 mg Tab, Take 1 tablet by mouth Daily., Disp: 30 tablet, Rfl: 11    labetaloL (NORMODYNE) 100 MG tablet, Take 1 tablet (100 mg total) by mouth 2 (two) times daily., Disp: 60 tablet, Rfl: 11    JARDIANCE 10 mg tablet, Take 10 mg by mouth., Disp: , Rfl:     nitrofurantoin, macrocrystal-monohydrate, (MACROBID) 100 MG capsule, Take 100 mg by mouth every 12 (twelve) hours., Disp: , Rfl:        Review of Systems   Constitutional:  Negative for fever.   Respiratory:  Negative for cough and shortness of breath.    Cardiovascular:  Negative for chest pain.   Gastrointestinal:  Positive for abdominal pain and nausea. Negative for constipation, diarrhea and vomiting.   Genitourinary:  Negative for dysuria, hematuria and menstrual irregularity.   Neurological:  Negative for headaches.          Objective:    BP  "132/82 (BP Location: Right arm, Patient Position: Sitting, BP Method: Large (Automatic))   Pulse 70   Temp 98.2 °F (36.8 °C) (Oral)   Resp 18   Ht 5' 5" (1.651 m)   Wt 130.6 kg (288 lb)   LMP 09/24/2023 Comment: cycle last from february to may  SpO2 100%   BMI 47.93 kg/m²      Physical Exam  Constitutional:       Appearance: She is obese.   Eyes:      Extraocular Movements: Extraocular movements intact.   Cardiovascular:      Rate and Rhythm: Normal rate and regular rhythm.      Pulses: Normal pulses.      Heart sounds: Normal heart sounds.   Pulmonary:      Effort: Pulmonary effort is normal.      Breath sounds: Normal breath sounds.   Abdominal:      Palpations: Abdomen is soft.      Tenderness: There is no abdominal tenderness. There is no guarding or rebound.   Neurological:      Mental Status: She is alert and oriented to person, place, and time.          Assessment and Plan:    1. Nausea    2. Abdominal pain, unspecified abdominal location    3. Pregnancy, unspecified gestational age         Nausea  -     POCT URINALYSIS W/O SCOPE  -     POCT urine pregnancy  -     Cancel: HCG, Serum, Qualitative; Future; Expected date: 12/08/2023  -     hCG, Total, Quantitative; Future; Expected date: 12/08/2023  -bland diet and advance as tolerated  -tolerating food well    Abdominal pain, unspecified abdominal location  -     POCT URINALYSIS W/O SCOPE  -     POCT urine pregnancy  -     Urine culture; Future; Expected date: 12/08/2023  -     Cancel: HCG, Serum, Qualitative; Future; Expected date: 12/08/2023  -     hCG, Total, Quantitative; Future; Expected date: 12/08/2023  -     hCG, Total, Quantitative; Future; Expected date: 12/08/2023  -exam negative.   -urine culture    Pregnancy, unspecified gestational age  -     hCG, Total, Quantitative; Future; Expected date: 12/08/2023  -     hCG, Total, Quantitative; Future; Expected date: 12/08/2023  -recent miscarriage. Will check quantitative today and then next " week        There are no Patient Instructions on file for this visit.   Follow up if symptoms worsen or fail to improve.

## 2023-12-10 LAB — UA COMPLETE W REFLEX CULTURE PNL UR: NORMAL

## 2024-01-23 ENCOUNTER — OFFICE VISIT (OUTPATIENT)
Dept: OBSTETRICS AND GYNECOLOGY | Facility: CLINIC | Age: 34
End: 2024-01-23
Payer: COMMERCIAL

## 2024-01-23 VITALS
HEART RATE: 75 BPM | RESPIRATION RATE: 17 BRPM | BODY MASS INDEX: 48.09 KG/M2 | OXYGEN SATURATION: 99 % | HEIGHT: 65 IN | DIASTOLIC BLOOD PRESSURE: 84 MMHG | SYSTOLIC BLOOD PRESSURE: 122 MMHG | WEIGHT: 288.63 LBS

## 2024-01-23 DIAGNOSIS — Z32.02 URINE PREGNANCY TEST NEGATIVE: ICD-10-CM

## 2024-01-23 DIAGNOSIS — N39.41 URGE INCONTINENCE: ICD-10-CM

## 2024-01-23 DIAGNOSIS — Z30.011 ENCOUNTER FOR BCP (BIRTH CONTROL PILLS) INITIAL PRESCRIPTION: ICD-10-CM

## 2024-01-23 DIAGNOSIS — N92.6 MISSED PERIOD: Primary | ICD-10-CM

## 2024-01-23 LAB
B-HCG UR QL: NEGATIVE
CTP QC/QA: YES

## 2024-01-23 PROCEDURE — 3079F DIAST BP 80-89 MM HG: CPT | Mod: CPTII,,, | Performed by: ADVANCED PRACTICE MIDWIFE

## 2024-01-23 PROCEDURE — 99213 OFFICE O/P EST LOW 20 MIN: CPT | Mod: ,,, | Performed by: ADVANCED PRACTICE MIDWIFE

## 2024-01-23 PROCEDURE — 81025 URINE PREGNANCY TEST: CPT | Mod: QW,,, | Performed by: ADVANCED PRACTICE MIDWIFE

## 2024-01-23 PROCEDURE — 1159F MED LIST DOCD IN RCRD: CPT | Mod: CPTII,,, | Performed by: ADVANCED PRACTICE MIDWIFE

## 2024-01-23 PROCEDURE — 81025 URINE PREGNANCY TEST: CPT | Mod: RHCUB | Performed by: ADVANCED PRACTICE MIDWIFE

## 2024-01-23 PROCEDURE — 3074F SYST BP LT 130 MM HG: CPT | Mod: CPTII,,, | Performed by: ADVANCED PRACTICE MIDWIFE

## 2024-01-23 PROCEDURE — 3008F BODY MASS INDEX DOCD: CPT | Mod: CPTII,,, | Performed by: ADVANCED PRACTICE MIDWIFE

## 2024-01-23 RX ORDER — NORETHINDRONE 0.35 MG/1
1 TABLET ORAL DAILY
Qty: 30 TABLET | Refills: 11 | Status: SHIPPED | OUTPATIENT
Start: 2024-01-23 | End: 2024-02-08

## 2024-01-23 NOTE — PROGRESS NOTES
Subjective     Patient ID: Coby Driver is a 33 y.o. female.    Chief Complaint: Amenorrhea (Last cycle: )    Ms Driver wants a pregnancy test.  She has A SAB 2023.  She is a .  She has hypertension, increased BMI, had gastric surgery for weight loss and has lost about one hundred pounds.   I advised that her pregnancy would be considered high risk and suggested she think about not having more children.  Her  wants  her to get pregnant again.  I told her I would be happy to explain the healthy risk to him.  She had sex unprotected 8 days ago.  Her UPT is negative.  She wants to take ocps for contracepiton.   She has noticed urgency prior to urination since the miscarriage.     Review of Systems   Constitutional: Negative.    HENT: Negative.     Respiratory: Negative.     Cardiovascular: Negative.    Gastrointestinal: Negative.    Genitourinary:  Positive for bladder incontinence, menstrual problem and urgency.   Musculoskeletal: Negative.    Integumentary:  Negative.   Neurological: Negative.    Psychiatric/Behavioral: Negative.          Objective     Physical Exam  Vitals and nursing note reviewed.   Constitutional:       Appearance: She is normal weight.   HENT:      Head: Normocephalic.      Nose: Nose normal.   Eyes:      Extraocular Movements: Extraocular movements intact.   Cardiovascular:      Rate and Rhythm: Normal rate and regular rhythm.      Heart sounds: Normal heart sounds.   Pulmonary:      Effort: Pulmonary effort is normal.      Breath sounds: Normal breath sounds.   Abdominal:      General: Abdomen is flat.      Palpations: Abdomen is soft.      Tenderness: There is no abdominal tenderness.   Skin:     General: Skin is warm and dry.   Neurological:      Mental Status: She is alert and oriented to person, place, and time.   Psychiatric:         Mood and Affect: Mood normal.         Behavior: Behavior normal.        Assessment and Plan     1. Missed period  -      POCT urine pregnancy    2. Urine pregnancy test negative    3. Encounter for BCP (birth control pills) initial prescription  -     norethindrone (MICRONOR) 0.35 mg tablet; Take 1 tablet (0.35 mg total) by mouth once daily.  Dispense: 30 tablet; Refill: 11    4. Urge incontinence  -     POCT URINALYSIS W/O SCOPE      Plan:  Start Micronor today, use a condoms for two weeks  Repeat UPT in one week if periods does not start  ACHES discussed. Decreased effectivness of ocps discussed due to weight >190 pounds  Encouraged to consider long term contraceptive method  F/u 2 months.         Follow up in about 2 months (around 3/23/2024).

## 2024-02-08 ENCOUNTER — OFFICE VISIT (OUTPATIENT)
Dept: FAMILY MEDICINE | Facility: CLINIC | Age: 34
End: 2024-02-08
Payer: COMMERCIAL

## 2024-02-08 VITALS
RESPIRATION RATE: 18 BRPM | SYSTOLIC BLOOD PRESSURE: 137 MMHG | BODY MASS INDEX: 47.21 KG/M2 | DIASTOLIC BLOOD PRESSURE: 82 MMHG | HEART RATE: 75 BPM | WEIGHT: 283.38 LBS | HEIGHT: 65 IN | OXYGEN SATURATION: 97 % | TEMPERATURE: 99 F

## 2024-02-08 DIAGNOSIS — U07.1 COVID-19: Primary | ICD-10-CM

## 2024-02-08 DIAGNOSIS — H92.09 OTALGIA, UNSPECIFIED LATERALITY: ICD-10-CM

## 2024-02-08 DIAGNOSIS — J02.9 SORE THROAT: ICD-10-CM

## 2024-02-08 LAB
CTP QC/QA: YES
FLUAV AG NPH QL: NEGATIVE
FLUBV AG NPH QL: NEGATIVE
S PYO RRNA THROAT QL PROBE: NEGATIVE
SARS-COV-2 AG RESP QL IA.RAPID: POSITIVE

## 2024-02-08 PROCEDURE — 87880 STREP A ASSAY W/OPTIC: CPT | Mod: RHCUB | Performed by: NURSE PRACTITIONER

## 2024-02-08 PROCEDURE — 3075F SYST BP GE 130 - 139MM HG: CPT | Mod: CPTII,,, | Performed by: NURSE PRACTITIONER

## 2024-02-08 PROCEDURE — 99213 OFFICE O/P EST LOW 20 MIN: CPT | Mod: ,,, | Performed by: NURSE PRACTITIONER

## 2024-02-08 PROCEDURE — 1160F RVW MEDS BY RX/DR IN RCRD: CPT | Mod: CPTII,,, | Performed by: NURSE PRACTITIONER

## 2024-02-08 PROCEDURE — 3008F BODY MASS INDEX DOCD: CPT | Mod: CPTII,,, | Performed by: NURSE PRACTITIONER

## 2024-02-08 PROCEDURE — 87804 INFLUENZA ASSAY W/OPTIC: CPT | Mod: RHCUB | Performed by: NURSE PRACTITIONER

## 2024-02-08 PROCEDURE — 1159F MED LIST DOCD IN RCRD: CPT | Mod: CPTII,,, | Performed by: NURSE PRACTITIONER

## 2024-02-08 PROCEDURE — 87426 SARSCOV CORONAVIRUS AG IA: CPT | Mod: RHCUB | Performed by: NURSE PRACTITIONER

## 2024-02-08 PROCEDURE — 3079F DIAST BP 80-89 MM HG: CPT | Mod: CPTII,,, | Performed by: NURSE PRACTITIONER

## 2024-02-08 RX ORDER — PHENTERMINE HYDROCHLORIDE 37.5 MG/1
37.5 TABLET ORAL DAILY
COMMUNITY
Start: 2024-01-30 | End: 2024-04-09

## 2024-02-08 RX ORDER — ERGOCALCIFEROL 1.25 MG/1
50000 CAPSULE ORAL
COMMUNITY
Start: 2024-01-30 | End: 2024-04-09

## 2024-02-08 RX ORDER — FOLIC ACID 1 MG/1
1000 TABLET ORAL DAILY
COMMUNITY
Start: 2024-01-30 | End: 2024-04-09

## 2024-02-08 RX ORDER — EMPAGLIFLOZIN 25 MG/1
25 TABLET, FILM COATED ORAL DAILY
COMMUNITY
Start: 2024-01-02 | End: 2024-04-09

## 2024-02-08 RX ORDER — FLUTICASONE PROPIONATE 50 MCG
2 SPRAY, SUSPENSION (ML) NASAL DAILY
Qty: 16 G | Refills: 0 | Status: SHIPPED | OUTPATIENT
Start: 2024-02-08 | End: 2024-04-09

## 2024-02-08 NOTE — PROGRESS NOTES
"   Cleopatra Orozco DNP, BLAIRE    [address] [place] [address]     PATIENT NAME: Coby Driver  : 1990  DATE: 24  MRN: [id number]      Billing Provider: Cleopatra Orozco DNP, FNP  Level of Service:   Patient PCP Information       Provider PCP Type    Bozena Schumacher NP General            Reason for Visit / Chief Complaint: Sore Throat, Otalgia, and Sinus Problem (Complains of "chest cold and sneezing" that started yesterday.)       Update PCP  Update Chief Complaint         History of Present Illness / Problem Focused Workflow     Coby Driver presents to the clinic with Sore Throat, Otalgia, and Sinus Problem (Complains of "chest cold and sneezing" that started yesterday.)     Sore Throat   Associated symptoms include ear pain. Pertinent negatives include no abdominal pain, congestion, coughing, diarrhea, headaches, shortness of breath or vomiting.   Otalgia   Associated symptoms include a sore throat. Pertinent negatives include no abdominal pain, coughing, diarrhea, headaches, hearing loss, rash or vomiting.   Sinus Problem  Associated symptoms include ear pain and a sore throat. Pertinent negatives include no chills, congestion, coughing, headaches or shortness of breath.       Review of Systems     Review of Systems   Constitutional:  Negative for activity change, appetite change, chills, fatigue and fever.   HENT:  Positive for ear pain and sore throat. Negative for nasal congestion, hearing loss and postnasal drip.    Respiratory:  Negative for cough, chest tightness, shortness of breath and wheezing.    Cardiovascular:  Negative for chest pain, palpitations, leg swelling and claudication.   Gastrointestinal:  Negative for abdominal pain, change in bowel habit, constipation, diarrhea, nausea and vomiting.   Genitourinary:  Negative for dysuria.   Musculoskeletal:  Negative for arthralgias, back pain, gait problem and myalgias.   Integumentary:  Negative " "for rash.   Neurological:  Negative for weakness and headaches.   Psychiatric/Behavioral:  Negative for suicidal ideas. The patient is not nervous/anxious.         Medical / Social / Family History     Past Medical History:   Diagnosis Date    Acne vulgaris     Alopecia     Anemia        Past Surgical History:   Procedure Laterality Date    BREAST SURGERY      GASTRIC RESTRICTION SURGERY      REDUCTION OF BOTH BREASTS Bilateral     TONSILLECTOMY Bilateral        Social History  Ms. Coby Driver  reports that she has never smoked. She has never been exposed to tobacco smoke. She has never used smokeless tobacco. She reports that she does not drink alcohol and does not use drugs.    Family History  Ms. Coby Driver's family history includes Diabetes in her mother; Heart failure in her father; Hypertension in her mother.    Medications and Allergies     Medications  Outpatient Medications Marked as Taking for the 2/8/24 encounter (Office Visit) with Cleopatra Orozco, CALEB, FNP   Medication Sig Dispense Refill    ergocalciferol (ERGOCALCIFEROL) 50,000 unit Cap Take 50,000 Units by mouth every 7 days.      folic acid (FOLVITE) 1 MG tablet Take 1,000 mcg by mouth once daily.      iron-vitamin C 100-250 mg, ICAR-C, (ICAR-C) 100-250 mg Tab Take 1 tablet by mouth Daily. 30 tablet 11    JARDIANCE 25 mg tablet Take 25 mg by mouth once daily.      labetaloL (NORMODYNE) 100 MG tablet Take 1 tablet (100 mg total) by mouth 2 (two) times daily. 60 tablet 11    phentermine (ADIPEX-P) 37.5 mg tablet Take 37.5 mg by mouth once daily.         Allergies  Review of patient's allergies indicates:   Allergen Reactions    Pimecrolimus        Physical Examination     Vitals:    02/08/24 1603   BP: 137/82   BP Location: Right arm   Patient Position: Sitting   BP Method: Large (Automatic)   Pulse: 75   Resp: 18   Temp: 98.5 °F (36.9 °C)   TempSrc: Oral   SpO2: 97%   Weight: 128.5 kg (283 lb 6.4 oz)   Height: 5' 5" (1.651 m)     Physical " Exam  Vitals and nursing note reviewed.   Constitutional:       General: She is not in acute distress.     Appearance: Normal appearance. She is not ill-appearing.   HENT:      Right Ear: Tympanic membrane is bulging.      Left Ear: Tympanic membrane is bulging.      Nose: Rhinorrhea present.   Eyes:      Extraocular Movements: Extraocular movements intact.      Pupils: Pupils are equal, round, and reactive to light.   Cardiovascular:      Rate and Rhythm: Normal rate and regular rhythm.      Heart sounds: Normal heart sounds.   Pulmonary:      Effort: Pulmonary effort is normal.      Breath sounds: Normal breath sounds.   Abdominal:      General: Bowel sounds are normal.      Palpations: Abdomen is soft.   Musculoskeletal:         General: Normal range of motion.   Skin:     Findings: No rash.   Neurological:      General: No focal deficit present.      Mental Status: She is alert and oriented to person, place, and time. Mental status is at baseline.   Psychiatric:         Mood and Affect: Mood normal.         Behavior: Behavior normal.          Assessment and Plan (including Health Maintenance)      Problem List  Smart Sets  Document Outside HM   :    Plan:         Health Maintenance Due   Topic Date Due    COVID-19 Vaccine (1) Never done    TETANUS VACCINE  Never done    Hemoglobin A1c (Prediabetes)  01/25/2024       Problem List Items Addressed This Visit    None  Visit Diagnoses       COVID-19    -  Primary    Relevant Medications    chlorpheniramine-phenylephrine 4-10 mg per tablet    Sore throat        Relevant Orders    POCT rapid strep A (Completed)    POCT Influenza A/B (Completed)    SARS Coronavirus 2 Antigen, POCT (Completed)    Otalgia, unspecified laterality        Relevant Medications    fluticasone propionate (FLONASE) 50 mcg/actuation nasal spray    chlorpheniramine-phenylephrine 4-10 mg per tablet    Other Relevant Orders    POCT rapid strep A (Completed)    POCT Influenza A/B (Completed)           COVID-19  -     chlorpheniramine-phenylephrine 4-10 mg per tablet; Take 1 tablet by mouth 3 (three) times daily as needed for Congestion.  Dispense: 30 tablet; Refill: 0    Sore throat  -     POCT rapid strep A  -     POCT Influenza A/B  -     SARS Coronavirus 2 Antigen, POCT    Otalgia, unspecified laterality  -     POCT rapid strep A  -     POCT Influenza A/B  -     fluticasone propionate (FLONASE) 50 mcg/actuation nasal spray; 2 sprays (100 mcg total) by Each Nostril route once daily.  Dispense: 16 g; Refill: 0  -     chlorpheniramine-phenylephrine 4-10 mg per tablet; Take 1 tablet by mouth 3 (three) times daily as needed for Congestion.  Dispense: 30 tablet; Refill: 0       Health Maintenance Topics with due status: Not Due       Topic Last Completion Date    Cervical Cancer Screening 06/05/2023           Future Appointments   Date Time Provider Department Center   3/25/2024  9:15 AM Stephanie Javier CNM [facility]        Follow up if symptoms worsen or fail to improve.     Signature:  Cleopatra Orozco DNP, FNP  [place] [address] [place] [address]  Phone #: [phone]  Fax #: [phone]    Date of encounter: 2/8/24    Patient Instructions   Quarantine for 5 days then mask for 5 days. Increase fluid intake and rest! Seek medical attention if symptoms persist or worsen.

## 2024-04-09 ENCOUNTER — OFFICE VISIT (OUTPATIENT)
Dept: OBSTETRICS AND GYNECOLOGY | Facility: CLINIC | Age: 34
End: 2024-04-09
Payer: COMMERCIAL

## 2024-04-09 VITALS
DIASTOLIC BLOOD PRESSURE: 83 MMHG | SYSTOLIC BLOOD PRESSURE: 127 MMHG | RESPIRATION RATE: 16 BRPM | WEIGHT: 293 LBS | BODY MASS INDEX: 48.82 KG/M2 | HEIGHT: 65 IN

## 2024-04-09 DIAGNOSIS — L30.4 INTERTRIGO: Primary | ICD-10-CM

## 2024-04-09 DIAGNOSIS — E66.01 MORBID OBESITY WITH BMI OF 45.0-49.9, ADULT: ICD-10-CM

## 2024-04-09 DIAGNOSIS — L98.7 EXCESSIVE SKIN AND SUBCUTANEOUS TISSUE: ICD-10-CM

## 2024-04-09 PROCEDURE — 1159F MED LIST DOCD IN RCRD: CPT | Mod: CPTII,,, | Performed by: ADVANCED PRACTICE MIDWIFE

## 2024-04-09 PROCEDURE — 3079F DIAST BP 80-89 MM HG: CPT | Mod: CPTII,,, | Performed by: ADVANCED PRACTICE MIDWIFE

## 2024-04-09 PROCEDURE — 99212 OFFICE O/P EST SF 10 MIN: CPT | Mod: ,,, | Performed by: ADVANCED PRACTICE MIDWIFE

## 2024-04-09 PROCEDURE — 3074F SYST BP LT 130 MM HG: CPT | Mod: CPTII,,, | Performed by: ADVANCED PRACTICE MIDWIFE

## 2024-04-09 PROCEDURE — 3008F BODY MASS INDEX DOCD: CPT | Mod: CPTII,,, | Performed by: ADVANCED PRACTICE MIDWIFE

## 2024-04-09 RX ORDER — CLOTRIMAZOLE 1 %
CREAM (GRAM) TOPICAL 2 TIMES DAILY
Qty: 113 G | Refills: 2 | Status: SHIPPED | OUTPATIENT
Start: 2024-04-09 | End: 2025-04-09

## 2024-04-09 NOTE — PROGRESS NOTES
"Patient ID:  Coby Driver is a 33 y.o. female      Chief Complaint:   Chief Complaint   Patient presents with    Rash     Patient c/o a rash to her waistline that itchy and has a odor. Patient has not tried OTC medication.     urinary incontinency     Patient complains of urinary incontinency for years. Patient states she can not sneeze without peeing herself. Patient states this has gotten worse the past couple of months.         HPI:  Coby is in with c/o rash in skin folds. Has had gastric sleeve surgery done in [place] at [facility] by Dr. Vic Emanuel, lost approximately 83 lbs. States rash in excess skin is worsening causing itching and odor. Desires to have abdominoplasty and panniculectomy for skin removal. Will send for consult at [facility]. Discussed treatment.   LMP: Patient's last menstrual period was 2023.   Sexually active:  yes,   Contraceptive: none      Past Medical History:   Diagnosis Date    Acne vulgaris     Alopecia     Anemia      Past Surgical History:   Procedure Laterality Date    BREAST SURGERY      GASTRIC RESTRICTION SURGERY      REDUCTION OF BOTH BREASTS Bilateral     TONSILLECTOMY Bilateral        OB History          5    Para   2    Term   1       1    AB   2    Living   2         SAB   1    IAB        Ectopic        Multiple        Live Births   2                 /83 (BP Location: Left arm, Patient Position: Sitting)   Resp 16   Ht 5' 5" (1.651 m)   Wt 135.6 kg (299 lb)   LMP 2023 Comment: cycle last from february to may  BMI 49.76 kg/m²   Wt Readings from Last 3 Encounters:   24 135.6 kg (299 lb)   24 128.5 kg (283 lb 6.4 oz)   24 130.9 kg (288 lb 9.6 oz)          ROS:  Review of Systems   Constitutional: Negative.    Eyes: Negative.    Respiratory: Negative.     Cardiovascular: Negative.    Gastrointestinal: Negative.    Genitourinary: Negative.    Musculoskeletal: Negative.    Integumentary:  Positive for rash.       "  Itchy rash with odor in excess skin folds   Neurological: Negative.    Psychiatric/Behavioral: Negative.            PHYSICAL EXAM:  Physical Exam  Constitutional:       Appearance: Normal appearance. She is obese.   Eyes:      Extraocular Movements: Extraocular movements intact.   Cardiovascular:      Rate and Rhythm: Normal rate.      Pulses: Normal pulses.   Pulmonary:      Effort: Pulmonary effort is normal.   Musculoskeletal:         General: Normal range of motion.      Cervical back: Normal range of motion.   Skin:     General: Skin is warm and dry.      Findings: Rash present.      Comments: Intertrigo noted under skin folds of pendulous abd and skin fold across back    Neurological:      Mental Status: She is alert and oriented to person, place, and time.   Psychiatric:         Mood and Affect: Mood normal.         Behavior: Behavior normal.         Thought Content: Thought content normal.         Judgment: Judgment normal.          Assessment:  Coby was seen today for rash and urinary incontinency.    Diagnoses and all orders for this visit:    Intertrigo  -     clotrimazole (LOTRIMIN) 1 % cream; Apply topically 2 (two) times daily. To rash in skin folds  -     Ambulatory referral/consult to Plastic Surgery; Future    Morbid obesity with BMI of 45.0-49.9, adult  -     Ambulatory referral/consult to Plastic Surgery; Future    Excessive skin and subcutaneous tissue  -     Ambulatory referral/consult to Plastic Surgery; Future          ICD-10-CM ICD-9-CM    1. Intertrigo  L30.4 695.89 clotrimazole (LOTRIMIN) 1 % cream      Ambulatory referral/consult to Plastic Surgery      2. Morbid obesity with BMI of 45.0-49.9, adult  E66.01 278.01 Ambulatory referral/consult to Plastic Surgery    Z68.42 V85.42       3. Excessive skin and subcutaneous tissue  L98.7 701.9 Ambulatory referral/consult to Plastic Surgery          Plan:  Discussed skin care, keep clean and dry  Rx sent for clotrimazole, instructed on use  Will  send referral for plastic surgery consult at [facility] for possible abdominoplasty and panniculectomy  Encouraged continue weight management to prevent weight gain    Follow up in about 1 month (around 5/9/2024) for surveillance.

## 2024-04-11 PROBLEM — L98.7 EXCESSIVE SKIN AND SUBCUTANEOUS TISSUE: Status: ACTIVE | Noted: 2024-04-11

## 2024-04-11 PROBLEM — L30.4 INTERTRIGO: Status: ACTIVE | Noted: 2024-04-11

## 2024-04-23 ENCOUNTER — OFFICE VISIT (OUTPATIENT)
Dept: OBSTETRICS AND GYNECOLOGY | Facility: CLINIC | Age: 34
End: 2024-04-23
Payer: COMMERCIAL

## 2024-04-23 VITALS
BODY MASS INDEX: 46.12 KG/M2 | WEIGHT: 287 LBS | SYSTOLIC BLOOD PRESSURE: 139 MMHG | TEMPERATURE: 99 F | OXYGEN SATURATION: 100 % | HEART RATE: 63 BPM | DIASTOLIC BLOOD PRESSURE: 89 MMHG | HEIGHT: 66 IN | RESPIRATION RATE: 18 BRPM

## 2024-04-23 DIAGNOSIS — E66.01 MORBID OBESITY WITH BMI OF 45.0-49.9, ADULT: ICD-10-CM

## 2024-04-23 DIAGNOSIS — L02.92 BOIL: Primary | ICD-10-CM

## 2024-04-23 PROCEDURE — 3075F SYST BP GE 130 - 139MM HG: CPT | Mod: CPTII,,, | Performed by: ADVANCED PRACTICE MIDWIFE

## 2024-04-23 PROCEDURE — 87077 CULTURE AEROBIC IDENTIFY: CPT | Mod: ,,, | Performed by: CLINICAL MEDICAL LABORATORY

## 2024-04-23 PROCEDURE — 99213 OFFICE O/P EST LOW 20 MIN: CPT | Mod: ,,, | Performed by: ADVANCED PRACTICE MIDWIFE

## 2024-04-23 PROCEDURE — 3079F DIAST BP 80-89 MM HG: CPT | Mod: CPTII,,, | Performed by: ADVANCED PRACTICE MIDWIFE

## 2024-04-23 PROCEDURE — 87070 CULTURE OTHR SPECIMN AEROBIC: CPT | Mod: ,,, | Performed by: CLINICAL MEDICAL LABORATORY

## 2024-04-23 PROCEDURE — 87186 SC STD MICRODIL/AGAR DIL: CPT | Mod: ,,, | Performed by: CLINICAL MEDICAL LABORATORY

## 2024-04-23 PROCEDURE — 3008F BODY MASS INDEX DOCD: CPT | Mod: CPTII,,, | Performed by: ADVANCED PRACTICE MIDWIFE

## 2024-04-23 PROCEDURE — 1159F MED LIST DOCD IN RCRD: CPT | Mod: CPTII,,, | Performed by: ADVANCED PRACTICE MIDWIFE

## 2024-04-23 RX ORDER — SULFAMETHOXAZOLE AND TRIMETHOPRIM 800; 160 MG/1; MG/1
1 TABLET ORAL 2 TIMES DAILY
Qty: 10 TABLET | Refills: 0 | Status: SHIPPED | OUTPATIENT
Start: 2024-04-23 | End: 2024-04-28

## 2024-04-23 RX ORDER — TIRZEPATIDE 7.5 MG/.5ML
7.5 INJECTION, SOLUTION SUBCUTANEOUS WEEKLY
COMMUNITY
Start: 2024-04-10

## 2024-04-25 ENCOUNTER — OFFICE VISIT (OUTPATIENT)
Dept: FAMILY MEDICINE | Facility: CLINIC | Age: 34
End: 2024-04-25
Payer: COMMERCIAL

## 2024-04-25 VITALS
DIASTOLIC BLOOD PRESSURE: 86 MMHG | HEIGHT: 65 IN | TEMPERATURE: 98 F | RESPIRATION RATE: 18 BRPM | BODY MASS INDEX: 47.78 KG/M2 | SYSTOLIC BLOOD PRESSURE: 128 MMHG | OXYGEN SATURATION: 100 % | WEIGHT: 286.81 LBS | HEART RATE: 70 BPM

## 2024-04-25 DIAGNOSIS — B37.9 CANDIDIASIS: ICD-10-CM

## 2024-04-25 DIAGNOSIS — Z87.19 HX OF UMBILICAL HERNIA REPAIR: ICD-10-CM

## 2024-04-25 DIAGNOSIS — L03.90 CELLULITIS, UNSPECIFIED CELLULITIS SITE: Primary | ICD-10-CM

## 2024-04-25 DIAGNOSIS — Z98.890 HX OF UMBILICAL HERNIA REPAIR: ICD-10-CM

## 2024-04-25 PROBLEM — Z97.5 BREAKTHROUGH BLEEDING ON NEXPLANON: Status: RESOLVED | Noted: 2023-05-21 | Resolved: 2024-04-25

## 2024-04-25 PROBLEM — N92.6 IRREGULAR MENSTRUAL CYCLE: Status: RESOLVED | Noted: 2023-05-21 | Resolved: 2024-04-25

## 2024-04-25 PROBLEM — N92.1 BREAKTHROUGH BLEEDING ON NEXPLANON: Status: RESOLVED | Noted: 2023-05-21 | Resolved: 2024-04-25

## 2024-04-25 PROCEDURE — 3008F BODY MASS INDEX DOCD: CPT | Mod: CPTII,,, | Performed by: NURSE PRACTITIONER

## 2024-04-25 PROCEDURE — 1160F RVW MEDS BY RX/DR IN RCRD: CPT | Mod: CPTII,,, | Performed by: NURSE PRACTITIONER

## 2024-04-25 PROCEDURE — 3079F DIAST BP 80-89 MM HG: CPT | Mod: CPTII,,, | Performed by: NURSE PRACTITIONER

## 2024-04-25 PROCEDURE — 3074F SYST BP LT 130 MM HG: CPT | Mod: CPTII,,, | Performed by: NURSE PRACTITIONER

## 2024-04-25 PROCEDURE — 99213 OFFICE O/P EST LOW 20 MIN: CPT | Mod: ,,, | Performed by: NURSE PRACTITIONER

## 2024-04-25 PROCEDURE — 1159F MED LIST DOCD IN RCRD: CPT | Mod: CPTII,,, | Performed by: NURSE PRACTITIONER

## 2024-04-25 RX ORDER — NYSTATIN 100000 [USP'U]/G
POWDER TOPICAL 2 TIMES DAILY PRN
Qty: 60 G | Refills: 1 | Status: SHIPPED | OUTPATIENT
Start: 2024-04-25

## 2024-04-25 NOTE — PROGRESS NOTES
Clinic Note    Coby Driver is a 33 y.o. female     Chief Complaint:   Chief Complaint   Patient presents with    Rash     Check for boils on abdomen area. Drainage from umbilical area please check.    Health Maintenance     TETANUS VACCINE Never done  COVID-19 Vaccine(1 - 2023-24 season) Never done  Hemoglobin A1c (Prediabetes) due on 01/25/2024         Subjective:    Patient complains of boils to lower abdomen (along panty line). Patient states she has abscesses frequently to area that come and go. Areas burst and have foul odor. States she is on bactrim due to abscess last week. Patient admits to moisture under skin fold. Had gi sleeve in 2017.   Patient states sometimes she has drainage from umbilicus. Denies drainage today. Patient states she had hernia repair when she had sleeve. Denies bulge. Admits to occasional sharp fleeting pain. Denies n/v, diarrhea.     Rash  Pertinent negatives include no cough, diarrhea, fever, shortness of breath or vomiting.        Allergies:   Review of patient's allergies indicates:   Allergen Reactions    Pimecrolimus         Past Medical History:  Past Medical History:   Diagnosis Date    Acne vulgaris     Alopecia     Anemia         Current Medications:    Current Outpatient Medications:     clotrimazole (LOTRIMIN) 1 % cream, Apply topically 2 (two) times daily. To rash in skin folds, Disp: 113 g, Rfl: 2    nystatin (MYCOSTATIN) powder, Apply topically 2 (two) times daily as needed., Disp: 60 g, Rfl: 1    sulfamethoxazole-trimethoprim 800-160mg (BACTRIM DS) 800-160 mg Tab, Take 1 tablet by mouth 2 (two) times daily. for 5 days, Disp: 10 tablet, Rfl: 0    ZEPBOUND 7.5 mg/0.5 mL PnIj, Inject 7.5 mg into the skin once a week., Disp: , Rfl:        Review of Systems   Constitutional:  Negative for fever.   Respiratory:  Negative for cough and shortness of breath.    Cardiovascular:  Negative for chest pain, palpitations and leg swelling.   Gastrointestinal:  Positive for  "abdominal pain. Negative for constipation, diarrhea, nausea and vomiting.   Genitourinary:  Negative for dysuria.   Integumentary:  Positive for rash.   Neurological:  Negative for headaches.          Objective:    /86 (BP Location: Left arm, Patient Position: Sitting, BP Method: Medium (Manual))   Pulse 70   Temp 98.4 °F (36.9 °C) (Oral)   Resp 18   Ht 5' 5" (1.651 m)   Wt 130.1 kg (286 lb 12.8 oz)   LMP 04/18/2024 Comment: not on birth control  SpO2 100%   BMI 47.73 kg/m²      Physical Exam  Constitutional:       Appearance: Normal appearance. She is obese.   Eyes:      Extraocular Movements: Extraocular movements intact.   Cardiovascular:      Rate and Rhythm: Normal rate and regular rhythm.      Pulses: Normal pulses.      Heart sounds: Normal heart sounds.   Pulmonary:      Effort: Pulmonary effort is normal.      Breath sounds: Normal breath sounds.   Abdominal:      Palpations: Abdomen is soft.      Tenderness: There is no abdominal tenderness. There is no guarding or rebound.   Skin:     Findings: Abscess present.             Comments: Tenderness noted to lower abdomen to skin. No drainage noted. No open wounds   Neurological:      Mental Status: She is alert and oriented to person, place, and time.          Assessment and Plan:    1. Cellulitis, unspecified cellulitis site    2. Candidiasis    3. Hx of umbilical hernia repair         Cellulitis, unspecified cellulitis site  -complete bactrim as prescribed  -warm moist compresses prn  -no shaving    Candidiasis  -     nystatin (MYCOSTATIN) powder; Apply topically 2 (two) times daily as needed.  Dispense: 60 g; Refill: 1  -not to use on open wounds. Powder to keep skin dry under abdominal fold  -clean daily and pat dry.     Hx of umbilical hernia repair  -     US Abdomen Limited; Future; Expected date: 04/25/2024  -umbilicus drainage per patient        There are no Patient Instructions on file for this visit.   Follow up if symptoms worsen or " fail to improve.

## 2024-04-25 NOTE — PROGRESS NOTES
"Patient ID:  Coby Driver is a 33 y.o. female      Chief Complaint:   Chief Complaint   Patient presents with    skin lesion     Patient stated she has an abdominal skin lesion and some drainage coming from her belly button that has a little odor and she wants this examined. Stated she has history of gastric hernia and had this repaired in the past. Had history of staph in her leg that she actually got a tatoo over to cover her scar.         HPI:  Coby is in with c/o "boil" in fold under pendulous abd. C/O odor at site. Has hx intertrigo and boils in area. Was given clotrimazole cream last visit, reports rash improved. Boil occurred over weekend, has ruptured and drained but is tender.  Has not received any info from referral to [facility] for abdominoplasty and panniculectomy.   LMP: Patient's last menstrual period was 2024.   Sexually active:  yes,   Contraceptive: none      Past Medical History:   Diagnosis Date    Acne vulgaris     Alopecia     Anemia      Past Surgical History:   Procedure Laterality Date    BREAST SURGERY      GASTRIC RESTRICTION SURGERY      REDUCTION OF BOTH BREASTS Bilateral     TONSILLECTOMY Bilateral        OB History          5    Para   2    Term   1       1    AB   2    Living   2         SAB   1    IAB        Ectopic        Multiple        Live Births   2                 /89 (BP Location: Right arm, Patient Position: Sitting, BP Method: Large (Automatic))   Pulse 63   Temp 98.5 °F (36.9 °C) (Oral)   Resp 18   Ht 5' 5.5" (1.664 m)   Wt 130.2 kg (287 lb)   LMP 2024 Comment: not on birth control  SpO2 100%   BMI 47.03 kg/m²   Wt Readings from Last 3 Encounters:   24 130.1 kg (286 lb 12.8 oz)   24 130.2 kg (287 lb)   24 135.6 kg (299 lb)          ROS:  Review of Systems   Constitutional: Negative.    Eyes: Negative.    Respiratory: Negative.     Cardiovascular: Negative.    Gastrointestinal: Negative.    Genitourinary: " Negative.    Musculoskeletal: Negative.    Integumentary:  Positive for mole/lesion.   Neurological: Negative.    Psychiatric/Behavioral: Negative.            PHYSICAL EXAM:  Physical Exam  Constitutional:       Appearance: Normal appearance. She is obese.   Eyes:      Extraocular Movements: Extraocular movements intact.   Cardiovascular:      Rate and Rhythm: Normal rate.      Pulses: Normal pulses.   Pulmonary:      Effort: Pulmonary effort is normal.   Abdominal:      Palpations: Abdomen is soft.       Musculoskeletal:         General: Normal range of motion.      Cervical back: Normal range of motion.   Skin:     General: Skin is warm and dry.   Neurological:      Mental Status: She is alert and oriented to person, place, and time.   Psychiatric:         Mood and Affect: Mood normal.         Behavior: Behavior normal.         Thought Content: Thought content normal.         Judgment: Judgment normal.          Assessment:  Coby was seen today for skin lesion.    Diagnoses and all orders for this visit:    Boil  -     sulfamethoxazole-trimethoprim 800-160mg (BACTRIM DS) 800-160 mg Tab; Take 1 tablet by mouth 2 (two) times daily. for 5 days  -     Culture, Wound; Future  -     Culture, Wound    Morbid obesity with BMI of 45.0-49.9, adult          ICD-10-CM ICD-9-CM    1. Boil  L02.92 680.9 sulfamethoxazole-trimethoprim 800-160mg (BACTRIM DS) 800-160 mg Tab      Culture, Wound      Culture, Wound      2. Morbid obesity with BMI of 45.0-49.9, adult  E66.01 278.01     Z68.42 V85.42           Plan:  Wound culture specimen obtained of drainage at boil site  Will call or send My Chart message after results return  Rx sent for Bactrim DS, instructed on use  Instructed to keep skin clean and dry, apply clotrimazole cream BID as needed    Follow up for care as needed.

## 2024-04-26 LAB — MICROORGANISM SPEC CULT: ABNORMAL

## 2024-05-08 ENCOUNTER — NUTRITION (OUTPATIENT)
Dept: FAMILY MEDICINE | Facility: CLINIC | Age: 34
End: 2024-05-08
Payer: COMMERCIAL

## 2024-05-09 NOTE — PROGRESS NOTES
Pt wanted to discuss meal planning and  have weigh-in today, weight today 281lbs, noted 286lbs on 4/2024.  Pt reports going to gym, doing Spin Class on Tuesday for 45mins. Reports walking 2 miles daily at gym or outdoors. Pt still using liquids between eating events, has had the gastric sleeve procedure and has attempted to stay on the UA protocol. Had a fried egg this morning, rotates from scrambled to fried which taste better as comment. Drinks Premier protein drinks with 30g Protein, enjoys salmon, cauliflower/broccoli for dinner.  Pt present with 3yo son, very active and I suggested keeping up with child as much as possible for exercise.  Measurements today: Left arm 15inches,   Right arm 15inches,   waist 52inches,   thigh 31 1/2 inches.  Pep talk provided to continue to strive for Weight Management. Awaiting some skin repair in [place] to remove excess, no appointment yet.   I offered another visit any Monday-Thursday.

## 2024-05-14 ENCOUNTER — OFFICE VISIT (OUTPATIENT)
Dept: OBSTETRICS AND GYNECOLOGY | Facility: CLINIC | Age: 34
End: 2024-05-14
Payer: COMMERCIAL

## 2024-05-14 VITALS
HEIGHT: 65 IN | BODY MASS INDEX: 46.32 KG/M2 | WEIGHT: 278 LBS | OXYGEN SATURATION: 95 % | HEART RATE: 71 BPM | DIASTOLIC BLOOD PRESSURE: 88 MMHG | RESPIRATION RATE: 14 BRPM | SYSTOLIC BLOOD PRESSURE: 132 MMHG

## 2024-05-14 DIAGNOSIS — E66.01 MORBID OBESITY WITH BMI OF 45.0-49.9, ADULT: ICD-10-CM

## 2024-05-14 DIAGNOSIS — L30.4 INTERTRIGO: Primary | ICD-10-CM

## 2024-05-14 DIAGNOSIS — L98.7 EXCESSIVE SKIN AND SUBCUTANEOUS TISSUE: ICD-10-CM

## 2024-05-14 DIAGNOSIS — K21.9 GASTROESOPHAGEAL REFLUX DISEASE WITHOUT ESOPHAGITIS: ICD-10-CM

## 2024-05-14 PROCEDURE — 1159F MED LIST DOCD IN RCRD: CPT | Mod: CPTII,,, | Performed by: ADVANCED PRACTICE MIDWIFE

## 2024-05-14 PROCEDURE — 3075F SYST BP GE 130 - 139MM HG: CPT | Mod: CPTII,,, | Performed by: ADVANCED PRACTICE MIDWIFE

## 2024-05-14 PROCEDURE — 99213 OFFICE O/P EST LOW 20 MIN: CPT | Mod: ,,, | Performed by: ADVANCED PRACTICE MIDWIFE

## 2024-05-14 PROCEDURE — 3008F BODY MASS INDEX DOCD: CPT | Mod: CPTII,,, | Performed by: ADVANCED PRACTICE MIDWIFE

## 2024-05-14 PROCEDURE — 3079F DIAST BP 80-89 MM HG: CPT | Mod: CPTII,,, | Performed by: ADVANCED PRACTICE MIDWIFE

## 2024-05-14 RX ORDER — PANTOPRAZOLE SODIUM 40 MG/1
40 TABLET, DELAYED RELEASE ORAL DAILY
Qty: 90 TABLET | Refills: 3 | Status: SHIPPED | OUTPATIENT
Start: 2024-05-14 | End: 2025-05-14

## 2024-05-14 RX ORDER — ERGOCALCIFEROL 1.25 MG/1
50000 CAPSULE ORAL
COMMUNITY
Start: 2024-04-24 | End: 2024-06-18

## 2024-05-14 RX ORDER — FLUCONAZOLE 150 MG/1
150 TABLET ORAL
COMMUNITY
Start: 2024-04-24 | End: 2024-06-18

## 2024-05-14 RX ORDER — ONDANSETRON 8 MG/1
8 TABLET, ORALLY DISINTEGRATING ORAL EVERY 12 HOURS PRN
Qty: 60 TABLET | Refills: 0 | Status: SHIPPED | OUTPATIENT
Start: 2024-05-14 | End: 2024-06-13

## 2024-05-14 NOTE — PROGRESS NOTES
"Patient ID:  Coby Driver is a 33 y.o. female      Chief Complaint:   Chief Complaint   Patient presents with    Rash     Pt is here with skin rash that she has had for about the past month. She has seen you previously for this issue. You prescribed her some medication and given nystatin powder for boil given by Dr. Cunha in [place]. The boil is gone but she still has a rash.         HPI:  Coby is in for follow up. Currently under treatment for intertrigo under pendulous abd and in groin areas. Has had gastric sleeve procedure with weight loss of 80 lbs. Desiring abdominoplasty and panniculectomy has obtain info from [facility]. Referral order has been placed as requested. Currently see Dr. Zamorano in [place] for weight loss management. Requesting med for nausea caused by weight loss injection and wants refill of acid medication.   LMP: Patient's last menstrual period was 2024.   Sexually active:  yes, , declines STD testing.   Contraceptive: none      Past Medical History:   Diagnosis Date    Acne vulgaris     Alopecia     Anemia      Past Surgical History:   Procedure Laterality Date    BREAST SURGERY      GASTRIC RESTRICTION SURGERY      REDUCTION OF BOTH BREASTS Bilateral     TONSILLECTOMY Bilateral        OB History          5    Para   2    Term   1       1    AB   2    Living   3         SAB   1    IAB        Ectopic        Multiple        Live Births   3                 /88 (BP Location: Left arm, Patient Position: Sitting, BP Method: Large (Automatic))   Pulse 71   Resp 14   Ht 5' 5" (1.651 m)   Wt 126.1 kg (278 lb)   LMP 2024 Comment: not on birth control  SpO2 95%   BMI 46.26 kg/m²   Wt Readings from Last 3 Encounters:   24 126.1 kg (278 lb)   24 130.1 kg (286 lb 12.8 oz)   24 130.2 kg (287 lb)          ROS:  Review of Systems   Constitutional: Negative.    Eyes: Negative.    Respiratory: Negative.     Cardiovascular: Negative.  "   Gastrointestinal: Negative.    Genitourinary: Negative.    Musculoskeletal: Negative.    Integumentary:  Positive for rash.   Neurological: Negative.    Psychiatric/Behavioral: Negative.            PHYSICAL EXAM:  Physical Exam  Constitutional:       Appearance: Normal appearance. She is obese.   Eyes:      Extraocular Movements: Extraocular movements intact.   Cardiovascular:      Rate and Rhythm: Normal rate.      Pulses: Normal pulses.   Pulmonary:      Effort: Pulmonary effort is normal.   Abdominal:      Palpations: Abdomen is soft.      Tenderness: There is no abdominal tenderness.      Comments: Skin under pendulous abd and in groin area healing. No boils noted. Area covered in Nystatin powder.    Musculoskeletal:         General: Normal range of motion.      Cervical back: Normal range of motion.   Skin:     General: Skin is warm and dry.   Neurological:      Mental Status: She is alert and oriented to person, place, and time.   Psychiatric:         Mood and Affect: Mood normal.         Behavior: Behavior normal.         Thought Content: Thought content normal.         Judgment: Judgment normal.          Assessment:  Coby was seen today for rash.    Diagnoses and all orders for this visit:    Intertrigo  -     Ambulatory referral/consult to Plastic Surgery; Future    Gastroesophageal reflux disease without esophagitis  -     ondansetron (ZOFRAN-ODT) 8 MG TbDL; Take 1 tablet (8 mg total) by mouth every 12 (twelve) hours as needed (nausea).  -     pantoprazole (PROTONIX) 40 MG tablet; Take 1 tablet (40 mg total) by mouth once daily.    Excessive skin and subcutaneous tissue  -     Ambulatory referral/consult to Plastic Surgery; Future    Morbid obesity with BMI of 45.0-49.9, adult  -     Ambulatory referral/consult to Plastic Surgery; Future          ICD-10-CM ICD-9-CM    1. Intertrigo  L30.4 695.89 Ambulatory referral/consult to Plastic Surgery      2. Gastroesophageal reflux disease without esophagitis   K21.9 530.81 ondansetron (ZOFRAN-ODT) 8 MG TbDL      pantoprazole (PROTONIX) 40 MG tablet      3. Excessive skin and subcutaneous tissue  L98.7 701.9 Ambulatory referral/consult to Plastic Surgery      4. Morbid obesity with BMI of 45.0-49.9, adult  E66.01 278.01 Ambulatory referral/consult to Plastic Surgery    Z68.42 V85.42           Plan:  Protonix refill sent as requested  Zofran sent for prn use to manage nausea caused by weight loss medication  Will resend referral consult to [facility] Plastic surgery  Encouraged to keep skin folds clean and dry, avoid scented lotions, use sensitive skin body care products    Follow up for care as needed.

## 2024-05-15 ENCOUNTER — HOSPITAL ENCOUNTER (OUTPATIENT)
Dept: RADIOLOGY | Facility: HOSPITAL | Age: 34
Discharge: HOME | End: 2024-05-15
Attending: NURSE PRACTITIONER
Payer: COMMERCIAL

## 2024-05-15 DIAGNOSIS — Z87.19 HX OF UMBILICAL HERNIA REPAIR: ICD-10-CM

## 2024-05-15 DIAGNOSIS — Z98.890 HX OF UMBILICAL HERNIA REPAIR: ICD-10-CM

## 2024-05-15 PROCEDURE — 76705 ECHO EXAM OF ABDOMEN: CPT | Mod: TC

## 2024-05-16 ENCOUNTER — TELEPHONE (OUTPATIENT)
Dept: OBSTETRICS AND GYNECOLOGY | Facility: CLINIC | Age: 34
End: 2024-05-16
Payer: COMMERCIAL

## 2024-05-16 NOTE — TELEPHONE ENCOUNTER
----- Message from Henry Carroll sent at 5/15/2024  8:29 AM CDT -----  Regarding: Plastic Surgery Referral  I called [facility] they do not do  abdominoplasty, it is considered cosmetic . Insurance does not cover cosmetic surgery.

## 2024-06-18 ENCOUNTER — OFFICE VISIT (OUTPATIENT)
Dept: OBSTETRICS AND GYNECOLOGY | Facility: CLINIC | Age: 34
End: 2024-06-18
Payer: COMMERCIAL

## 2024-06-18 VITALS
SYSTOLIC BLOOD PRESSURE: 132 MMHG | TEMPERATURE: 98 F | BODY MASS INDEX: 45.05 KG/M2 | HEART RATE: 81 BPM | OXYGEN SATURATION: 99 % | DIASTOLIC BLOOD PRESSURE: 87 MMHG | RESPIRATION RATE: 17 BRPM | WEIGHT: 270.38 LBS | HEIGHT: 65 IN

## 2024-06-18 DIAGNOSIS — S70.361A INSECT BITE OF RIGHT THIGH, INITIAL ENCOUNTER: Primary | ICD-10-CM
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
## 2024-06-18 DIAGNOSIS — E66.01 MORBID OBESITY WITH BMI OF 45.0-49.9, ADULT: ICD-10-CM

## 2024-06-18 PROCEDURE — 3079F DIAST BP 80-89 MM HG: CPT | Mod: CPTII,,, | Performed by: ADVANCED PRACTICE MIDWIFE

## 2024-06-18 PROCEDURE — 99212 OFFICE O/P EST SF 10 MIN: CPT | Mod: ,,, | Performed by: ADVANCED PRACTICE MIDWIFE

## 2024-06-18 PROCEDURE — 87070 CULTURE OTHR SPECIMN AEROBIC: CPT | Mod: ,,, | Performed by: CLINICAL MEDICAL LABORATORY

## 2024-06-18 PROCEDURE — 1159F MED LIST DOCD IN RCRD: CPT | Mod: CPTII,,, | Performed by: ADVANCED PRACTICE MIDWIFE

## 2024-06-18 PROCEDURE — 3075F SYST BP GE 130 - 139MM HG: CPT | Mod: CPTII,,, | Performed by: ADVANCED PRACTICE MIDWIFE

## 2024-06-18 PROCEDURE — 3008F BODY MASS INDEX DOCD: CPT | Mod: CPTII,,, | Performed by: ADVANCED PRACTICE MIDWIFE

## 2024-06-18 NOTE — PROGRESS NOTES
"Patient ID:  Coby Driver is a 33 y.o. female      Chief Complaint:   Chief Complaint   Patient presents with    Rash and boil on near vaginal area        HPI:  Coby is in with c/o boil and rash under pendulous abd and bug bite on right thigh. Was treated at an urgent care clinic with clindamycin and given ointment for bug bite.   LMP: Patient's last menstrual period was 2024.   Sexually active:  yes  Contraceptive: none      Past Medical History:   Diagnosis Date    Acne vulgaris     Alopecia     Anemia      Past Surgical History:   Procedure Laterality Date    BREAST SURGERY      GASTRIC RESTRICTION SURGERY      REDUCTION OF BOTH BREASTS Bilateral     TONSILLECTOMY Bilateral        OB History          5    Para   2    Term   1       1    AB   2    Living   3         SAB   1    IAB        Ectopic        Multiple        Live Births   3                 /87 (BP Location: Left arm)   Pulse 81   Temp 98.2 °F (36.8 °C)   Resp 17   Ht 5' 5" (1.651 m)   Wt 122.7 kg (270 lb 6.4 oz)   LMP 2024 Comment: cycle last from february to may  SpO2 99%   BMI 45.00 kg/m²   Wt Readings from Last 3 Encounters:   24 122.7 kg (270 lb 6.4 oz)   24 126.1 kg (278 lb)   24 130.1 kg (286 lb 12.8 oz)          ROS:  Review of Systems   Constitutional: Negative.    Eyes: Negative.    Respiratory: Negative.     Cardiovascular: Negative.    Gastrointestinal: Negative.    Genitourinary: Negative.    Musculoskeletal: Negative.    Integumentary:  Positive for rash.        Bug bite   Neurological: Negative.    Psychiatric/Behavioral: Negative.            PHYSICAL EXAM:  Physical Exam  Constitutional:       Appearance: Normal appearance. She is obese.   Eyes:      Extraocular Movements: Extraocular movements intact.   Cardiovascular:      Rate and Rhythm: Normal rate.      Pulses: Normal pulses.   Pulmonary:      Effort: Pulmonary effort is normal.   Abdominal:      Palpations: Abdomen is " soft.      Comments: Skin accessed under pendulous abd. No rash, no boil. Skin intact.    Musculoskeletal:         General: Normal range of motion.      Cervical back: Normal range of motion.   Skin:     General: Skin is warm and dry.      Comments: Small raised papule noted outer right thigh. Slight redness surrounding area, no s/s infection. QUIANA BRANHAMP in to assess as well. Small puncture placed in center of papule with sterile needle, small amt serosanguineous drainage returned, wound culture swab obtained. Area cleaned with alcohol, antibiotic ointment to site, covered with Band-Aid.    Neurological:      Mental Status: She is alert and oriented to person, place, and time.   Psychiatric:         Mood and Affect: Mood normal.         Behavior: Behavior normal.         Thought Content: Thought content normal.         Judgment: Judgment normal.          Assessment:  Coby was seen today for rash and boil on near vaginal area.    Diagnoses and all orders for this visit:    Insect bite of right thigh, initial encounter  -     Culture, Wound; Future  -     Culture, Wound    Morbid obesity with BMI of 45.0-49.9, adult          ICD-10-CM ICD-9-CM    1. Insect bite of right thigh, initial encounter  S70.361A 916.4 Culture, Wound    W57.XXXA E906.4 Culture, Wound      2. Morbid obesity with BMI of 45.0-49.9, adult  E66.01 278.01     Z68.42 V85.42           Plan:  Wound culture swab to lab  Will call with results  Encouraged to keep site clean and dry, apply ointment Bid  Encouraged to complete antibiotics as prescribed    Follow up for care as needed.
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
## 2024-06-19 PROBLEM — S70.361A INSECT BITE OF RIGHT THIGH: Status: ACTIVE | Noted: 2024-06-19

## 2024-06-19 LAB — MICROORGANISM SPEC CULT: NORMAL

## 2024-06-20 LAB — MICROORGANISM SPEC CULT: NORMAL
